# Patient Record
Sex: FEMALE | Race: WHITE | NOT HISPANIC OR LATINO | URBAN - METROPOLITAN AREA
[De-identification: names, ages, dates, MRNs, and addresses within clinical notes are randomized per-mention and may not be internally consistent; named-entity substitution may affect disease eponyms.]

---

## 2021-02-02 ENCOUNTER — AMBULATORY - HEALTHEAST (OUTPATIENT)
Dept: NURSING | Facility: CLINIC | Age: 51
End: 2021-02-02

## 2021-02-23 ENCOUNTER — AMBULATORY - HEALTHEAST (OUTPATIENT)
Dept: NURSING | Facility: CLINIC | Age: 51
End: 2021-02-23

## 2021-06-01 ENCOUNTER — RECORDS - HEALTHEAST (OUTPATIENT)
Dept: ADMINISTRATIVE | Facility: CLINIC | Age: 51
End: 2021-06-01

## 2025-01-08 ENCOUNTER — HOSPITAL ENCOUNTER (EMERGENCY)
Facility: HOSPITAL | Age: 55
Discharge: HOME | End: 2025-01-08
Attending: EMERGENCY MEDICINE
Payer: COMMERCIAL

## 2025-01-08 VITALS
WEIGHT: 293 LBS | HEART RATE: 76 BPM | HEIGHT: 66 IN | BODY MASS INDEX: 47.09 KG/M2 | DIASTOLIC BLOOD PRESSURE: 73 MMHG | SYSTOLIC BLOOD PRESSURE: 145 MMHG | TEMPERATURE: 98.1 F | RESPIRATION RATE: 19 BRPM | OXYGEN SATURATION: 98 %

## 2025-01-08 DIAGNOSIS — I48.91 ATRIAL FIBRILLATION WITH RAPID VENTRICULAR RESPONSE (H): ICD-10-CM

## 2025-01-08 PROBLEM — I48.0 PAROXYSMAL ATRIAL FIBRILLATION (H): Status: ACTIVE | Noted: 2018-01-22

## 2025-01-08 PROBLEM — Z79.01 CURRENT USE OF LONG TERM ANTICOAGULATION: Status: ACTIVE | Noted: 2021-01-08

## 2025-01-08 PROBLEM — Z98.84 H/O BARIATRIC SURGERY: Status: ACTIVE | Noted: 2021-10-18

## 2025-01-08 PROBLEM — I10 ESSENTIAL HYPERTENSION: Status: ACTIVE | Noted: 2018-04-13

## 2025-01-08 PROBLEM — G47.33 OBSTRUCTIVE SLEEP APNEA ON CPAP: Status: ACTIVE | Noted: 2018-06-19

## 2025-01-08 LAB
ALBUMIN SERPL BCG-MCNC: 4 G/DL (ref 3.5–5.2)
ALP SERPL-CCNC: 87 U/L (ref 40–150)
ALT SERPL W P-5'-P-CCNC: 17 U/L (ref 0–50)
ANION GAP SERPL CALCULATED.3IONS-SCNC: 11 MMOL/L (ref 7–15)
AST SERPL W P-5'-P-CCNC: 18 U/L (ref 0–45)
BASOPHILS # BLD AUTO: 0 10E3/UL (ref 0–0.2)
BASOPHILS NFR BLD AUTO: 0 %
BILIRUB DIRECT SERPL-MCNC: <0.2 MG/DL (ref 0–0.3)
BILIRUB SERPL-MCNC: 0.2 MG/DL
BUN SERPL-MCNC: 14.6 MG/DL (ref 6–20)
CALCIUM SERPL-MCNC: 9.5 MG/DL (ref 8.8–10.4)
CHLORIDE SERPL-SCNC: 107 MMOL/L (ref 98–107)
CREAT SERPL-MCNC: 0.66 MG/DL (ref 0.51–0.95)
EGFRCR SERPLBLD CKD-EPI 2021: >90 ML/MIN/1.73M2
EOSINOPHIL # BLD AUTO: 0.4 10E3/UL (ref 0–0.7)
EOSINOPHIL NFR BLD AUTO: 6 %
ERYTHROCYTE [DISTWIDTH] IN BLOOD BY AUTOMATED COUNT: 19.4 % (ref 10–15)
GLUCOSE SERPL-MCNC: 107 MG/DL (ref 70–99)
HCO3 SERPL-SCNC: 25 MMOL/L (ref 22–29)
HCT VFR BLD AUTO: 44.1 % (ref 35–47)
HGB BLD-MCNC: 13.7 G/DL (ref 11.7–15.7)
IMM GRANULOCYTES # BLD: 0.1 10E3/UL
IMM GRANULOCYTES NFR BLD: 1 %
LYMPHOCYTES # BLD AUTO: 1.8 10E3/UL (ref 0.8–5.3)
LYMPHOCYTES NFR BLD AUTO: 23 %
MCH RBC QN AUTO: 22.3 PG (ref 26.5–33)
MCHC RBC AUTO-ENTMCNC: 31.1 G/DL (ref 31.5–36.5)
MCV RBC AUTO: 72 FL (ref 78–100)
MONOCYTES # BLD AUTO: 0.7 10E3/UL (ref 0–1.3)
MONOCYTES NFR BLD AUTO: 9 %
NEUTROPHILS # BLD AUTO: 4.7 10E3/UL (ref 1.6–8.3)
NEUTROPHILS NFR BLD AUTO: 61 %
NRBC # BLD AUTO: 0 10E3/UL
NRBC BLD AUTO-RTO: 0 /100
NT-PROBNP SERPL-MCNC: 124 PG/ML (ref 0–900)
PLATELET # BLD AUTO: 259 10E3/UL (ref 150–450)
POTASSIUM SERPL-SCNC: 3.9 MMOL/L (ref 3.4–5.3)
PROT SERPL-MCNC: 7.1 G/DL (ref 6.4–8.3)
RBC # BLD AUTO: 6.13 10E6/UL (ref 3.8–5.2)
SODIUM SERPL-SCNC: 143 MMOL/L (ref 135–145)
T4 FREE SERPL-MCNC: 1.08 NG/DL (ref 0.9–1.7)
TROPONIN T SERPL HS-MCNC: 10 NG/L
TROPONIN T SERPL HS-MCNC: 13 NG/L
TSH SERPL DL<=0.005 MIU/L-ACNC: 4.92 UIU/ML (ref 0.3–4.2)
WBC # BLD AUTO: 7.7 10E3/UL (ref 4–11)

## 2025-01-08 PROCEDURE — 84155 ASSAY OF PROTEIN SERUM: CPT | Performed by: EMERGENCY MEDICINE

## 2025-01-08 PROCEDURE — 82040 ASSAY OF SERUM ALBUMIN: CPT | Performed by: EMERGENCY MEDICINE

## 2025-01-08 PROCEDURE — 36415 COLL VENOUS BLD VENIPUNCTURE: CPT | Performed by: EMERGENCY MEDICINE

## 2025-01-08 PROCEDURE — 84443 ASSAY THYROID STIM HORMONE: CPT | Performed by: EMERGENCY MEDICINE

## 2025-01-08 PROCEDURE — 999N000157 HC STATISTIC RCP TIME EA 10 MIN

## 2025-01-08 PROCEDURE — 250N000011 HC RX IP 250 OP 636: Performed by: EMERGENCY MEDICINE

## 2025-01-08 PROCEDURE — 250N000009 HC RX 250: Performed by: EMERGENCY MEDICINE

## 2025-01-08 PROCEDURE — 96374 THER/PROPH/DIAG INJ IV PUSH: CPT | Mod: 59 | Performed by: EMERGENCY MEDICINE

## 2025-01-08 PROCEDURE — 92960 CARDIOVERSION ELECTRIC EXT: CPT | Performed by: EMERGENCY MEDICINE

## 2025-01-08 PROCEDURE — 80048 BASIC METABOLIC PNL TOTAL CA: CPT | Performed by: EMERGENCY MEDICINE

## 2025-01-08 PROCEDURE — 84439 ASSAY OF FREE THYROXINE: CPT | Performed by: EMERGENCY MEDICINE

## 2025-01-08 PROCEDURE — 83880 ASSAY OF NATRIURETIC PEPTIDE: CPT | Performed by: EMERGENCY MEDICINE

## 2025-01-08 PROCEDURE — 84484 ASSAY OF TROPONIN QUANT: CPT | Performed by: EMERGENCY MEDICINE

## 2025-01-08 PROCEDURE — 999N000099 HC STATISTIC MODERATE SEDATION < 10 MIN

## 2025-01-08 PROCEDURE — 99285 EMERGENCY DEPT VISIT HI MDM: CPT | Mod: 25 | Performed by: EMERGENCY MEDICINE

## 2025-01-08 PROCEDURE — 85025 COMPLETE CBC W/AUTO DIFF WBC: CPT | Performed by: EMERGENCY MEDICINE

## 2025-01-08 PROCEDURE — 82310 ASSAY OF CALCIUM: CPT | Performed by: EMERGENCY MEDICINE

## 2025-01-08 RX ORDER — ALBUTEROL SULFATE 90 UG/1
1-2 INHALANT RESPIRATORY (INHALATION) EVERY 4 HOURS PRN
COMMUNITY
Start: 2024-11-14

## 2025-01-08 RX ORDER — ETOMIDATE 2 MG/ML
10 INJECTION INTRAVENOUS ONCE
Status: COMPLETED | OUTPATIENT
Start: 2025-01-08 | End: 2025-01-08

## 2025-01-08 RX ORDER — BUPROPION HYDROCHLORIDE 150 MG/1
150 TABLET, EXTENDED RELEASE ORAL 2 TIMES DAILY
COMMUNITY
Start: 2024-09-11 | End: 2025-09-11

## 2025-01-08 RX ORDER — METOPROLOL SUCCINATE 50 MG/1
50 TABLET, EXTENDED RELEASE ORAL DAILY
COMMUNITY
Start: 2024-09-11

## 2025-01-08 RX ORDER — DILTIAZEM HYDROCHLORIDE 5 MG/ML
25 INJECTION INTRAVENOUS ONCE
Status: COMPLETED | OUTPATIENT
Start: 2025-01-08 | End: 2025-01-08

## 2025-01-08 RX ADMIN — DILTIAZEM HYDROCHLORIDE 25 MG: 5 INJECTION INTRAVENOUS at 19:46

## 2025-01-08 RX ADMIN — ETOMIDATE 10 MG: 2 INJECTION, SOLUTION INTRAVENOUS at 21:01

## 2025-01-08 ASSESSMENT — ACTIVITIES OF DAILY LIVING (ADL)
ADLS_ACUITY_SCORE: 41

## 2025-01-08 ASSESSMENT — COLUMBIA-SUICIDE SEVERITY RATING SCALE - C-SSRS
1. IN THE PAST MONTH, HAVE YOU WISHED YOU WERE DEAD OR WISHED YOU COULD GO TO SLEEP AND NOT WAKE UP?: NO
6. HAVE YOU EVER DONE ANYTHING, STARTED TO DO ANYTHING, OR PREPARED TO DO ANYTHING TO END YOUR LIFE?: YES
2. HAVE YOU ACTUALLY HAD ANY THOUGHTS OF KILLING YOURSELF IN THE PAST MONTH?: NO

## 2025-01-09 NOTE — PROGRESS NOTES
RT present for cardioversion. Patient placed on 2L NC sating 97%. BVM, suction and EtCO2 ready at bedside. Cardioversion performed with no complications. RT excused from room.     Sophie Reyna, RT

## 2025-01-09 NOTE — ED PROVIDER NOTES
"EMERGENCY DEPARTMENT NOTE     Name: Isabelle Gaxiola    Age/Sex: 54 year old female   MRN: [id number]   Evaluation Date & Time:  1/8/2025  7:42 PM    PCP:    Sowmya Hanna   ED Provider: Alonso Barry D.O.       CHIEF COMPLAINT    Palpitations     HISTORY OF PRESENT ILLNESS   Isabelle Gaxiola is a 54 year old year old female with a relevant past history of paroxysmal atrial fibrillation, obesity, and hypertension, who presents to the ED  for evaluation of palpitations.      DIAGNOSIS & DISPOSITION/MEDICAL DECISION MAKING     1. Atrial fibrillation with rapid ventricular response (H)        EMERGENCY DEPARTMENT COURSE   8:19 PM I met with the patient to gather history and to perform my initial exam.  We discussed treatment options and the plan for care while in the Emergency Department.  8:23 PM I consented the patient for a cardioversion.  8:55 PM I performed the cardioversion.   Triage vital signs: BP (!) 161/87   Pulse 110   Temp 98.1  F (36.7  C) (Oral)   Resp 16   Ht 1.676 m (5' 6\")   Wt 149.7 kg (330 lb)   SpO2 100%   BMI 53.26 kg/m     Differential diagnosis considered included but not limited to: ACS, CHF, electrolyte derangement, endocrine process including hyperthyroidism  Diagnostic studies:  Laboratory studies obtained and reviewed by myself:  EKG: Atrial fibrillation with rapid ventricular response  CBC and comprehensive metabolic profile within normal limits.  T SH 4.9 but normal free T41.08 Troponin 13, N-terminal   MDM: Patient is currently anticoagulated.  She has had prior successful cardioversion.  Discussed risks and benefits of cardioversion with sedation and patient accepted.  Patient was successfully cardioverted as per procedure note.  I discussed case with cardiology and they would not make any change in her chronic medication metoprolol.  Patient will discharge.  She will follow-up with her primary cardiologist this week.  If recurrent rapid or irregular heart rate or " "develops any new symptoms including chest pain or shortness of breath will return to the emergency department.     Discharge Vital Signs:BP (!) 145/73   Pulse 76   Temp 98.1  F (36.7  C) (Oral)   Resp 19   Ht 1.676 m (5' 6\")   Wt 149.7 kg (330 lb)   SpO2 98%   BMI 53.26 kg/m     PROCEDURES:       PROCEDURE: Electrical Cardioversion with Procedural Sedation   INDICATIONS: Sedation is required to allow for Cardioversion for Atrial Fibrilation    CARDIOVERSION TYPE: Biphasic, External   SEDATION PROVIDER: Dr Alonso Barry   CARDIOVERSION PROVIDER: Dr Alonso Barry   LEVEL OF SEDATION: Deep Sedation    Defined as:  Minimal = Normal response to verbal  Moderate = Responds to verbal and light tactile stimulation  Deep = Responds after repeated painful stimulation   CONSENT: Risks, benefits and alternatives were discussed with and Written consent was obtained from Patient.   PROCEDURE SPECIFIC CHECKLIST COMPLETED: Yes   LAST ORAL INTAKE: Clear Liquids >2 hours, Full Liquids > 4 hours, and Light Meal > 6 hours   ASA CLASS: 2 - Mild systemic disease   MALLAMPATI:  II - Faucial pillars and soft palate may be seen, but uvula is masked by the base of the tongue   TIME OUT: Universal protocol was followed. TIME OUT conducted just prior to starting procedure confirmed patient identity, site/side, procedure, patient position, and availability of correct equipment. Yes    Immediately prior to initiation of sedation, reassessment of clinical condition was performed which was unchanged.   MEDICATIONS GIVEN: Etomidate, 10 mg, IV    MONITORING: heart rate, cardiac monitor, continuous pulse oximeter, continuous capnometry (end tidal CO2), frequent blood pressure checks, level of consciousness checks, IV access, constant attendance by RN until patient is recovered, and constant attendance by MD until patient is stable   RESPONSE: vital signs stable, airway patent, and O2 saturations remained >92%   POST-SEDATION " ASSESSMENT/PROCEDURE NOTE: CARDIOVERSION: Quick combo electrodes were placed anterior-posterior  Trial 1: Synchronized shock at 100 joules was Successful    SEDATION:  Lowest level oxygen saturation reached was 97%.    Post procedure patient was alert and responds to verbal stimuli    Patient was monitored during recovery and returned to pre-procedure baseline.   TOTAL MD DRUG ADMINISTRATION / MONITORING TIME: 30 minutes.   COMPLICATIONS: Patient tolerated procedure well, without complication            Diagnostic studies:  No orders to display     Labs Ordered and Resulted from Time of ED Arrival to Time of ED Departure   BASIC METABOLIC PANEL - Abnormal       Result Value    Sodium 143      Potassium 3.9      Chloride 107      Carbon Dioxide (CO2) 25      Anion Gap 11      Urea Nitrogen 14.6      Creatinine 0.66      GFR Estimate >90      Calcium 9.5      Glucose 107 (*)    TSH WITH FREE T4 REFLEX - Abnormal    TSH 4.92 (*)    CBC WITH PLATELETS AND DIFFERENTIAL - Abnormal    WBC Count 7.7      RBC Count 6.13 (*)     Hemoglobin 13.7      Hematocrit 44.1      MCV 72 (*)     MCH 22.3 (*)     MCHC 31.1 (*)     RDW 19.4 (*)     Platelet Count 259      % Neutrophils 61      % Lymphocytes 23      % Monocytes 9      % Eosinophils 6      % Basophils 0      % Immature Granulocytes 1      NRBCs per 100 WBC 0      Absolute Neutrophils 4.7      Absolute Lymphocytes 1.8      Absolute Monocytes 0.7      Absolute Eosinophils 0.4      Absolute Basophils 0.0      Absolute Immature Granulocytes 0.1      Absolute NRBCs 0.0     HEPATIC FUNCTION PANEL - Normal    Protein Total 7.1      Albumin 4.0      Bilirubin Total 0.2      Alkaline Phosphatase 87      AST 18      ALT 17      Bilirubin Direct <0.20     TROPONIN T, HIGH SENSITIVITY - Normal    Troponin T, High Sensitivity 10     NT PROBNP INPATIENT - Normal    N terminal Pro BNP Inpatient 124     T4 FREE - Normal    Free T4 1.08       ED INTERVENTIONS     Medications   diltiazem  (CARDIZEM) injection 25 mg (25 mg Intravenous $Given 1/8/25 1946)   etomidate (AMIDATE) injection 10 mg (10 mg Intravenous $Given 1/8/25 2101)     TOTAL CRITICAL CARE TIME (EXCLUDING PROCEDURES): 30 minutes for management of cardiac arrhythmia requiring IV vasoactive medications and subsequent DC cardioversion with sedation      DISCHARGE MEDICATIONS        Review of your medicines        UNREVIEWED medicines. Ask your doctor about these medicines        Dose / Directions   albuterol 108 (90 Base) MCG/ACT inhaler  Commonly known as: PROAIR HFA/PROVENTIL HFA/VENTOLIN HFA      Dose: 1-2 puff  Inhale 1-2 puffs into the lungs every 4 hours as needed for shortness of breath or wheezing.  Refills: 0     apixaban ANTICOAGULANT 5 MG tablet  Commonly known as: ELIQUIS      Dose: 5 mg  Take 5 mg by mouth 2 times daily.  Refills: 0     buPROPion 150 MG 12 hr tablet  Commonly known as: WELLBUTRIN SR      Dose: 150 mg  Take 150 mg by mouth 2 times daily.  Refills: 0     cholecalciferol 50 MCG (2000 UT) Caps      Dose: 2,000 Units  Take 2,000 Units by mouth daily.  Refills: 0     metoprolol succinate ER 50 MG 24 hr tablet  Commonly known as: TOPROL XL      Dose: 50 mg  Take 50 mg by mouth daily.  Refills: 0            DISPOSITION: Home    Medical Decision Making    At the time of my evaluation, I do not feel the patient s symptoms are caused by sepsis      I obtained additional history from these independent historians:  NA  I reviewed these outside records:  11/14/2024 Urgent Care visit for URI symptoms with cough and sore throat  10/9/2022 ED visit    I noted these abnormal vital signs / labs:  Atrial fibrillation on EKG    Monitor Strip Interpretation:  Atrial fibrillation  12-Lead ECG Interpretation:  Atrial fibrillation with rapid ventricular response  I independently reviewed the following diagnostic studies:  NA  I spoke to the following clinicians regard the patients care:  Cardiology  My disposition decision is based on  "the following reasons:    Discharge: I considered admission but discharged the patient after current workup and patient's clinical course in the emergency department.  Prescription medications prescribed:none      MIPS Documentation Not Applicable    At the conclusion of the encounter I discussed the results of all of the tests and the disposition. The questions were answered. The patient or family acknowledged understanding and was agreeable with the care plan.            INFORMATION SOURCE AND LIMITATIONS    History/Exam limitations: None  Patient information was obtained from: patient   Use of : N/A    The patient has a history of atrial fibrillation and has had 2 cardioversions in the past. Around 6:00 PM this evening (1 hour prior to arrival), the patient felt like she was \"flip-flopping\" in and out of atrial fibrillation. This sensations is \"like someone is choking me\" and it is difficult for her to take a deep breath. She takes Eliquis and Metoprolol. She previously followed with cardiology but has not had a cardioversion in a few years so she has not seen them since then. She does think there is a component of anxiety today. Her last food was at 1:00 PM but she has sipped some water throughout the day.     She denies fever or any other complaints at this time.       REVIEW OF SYSTEMS:   All other systems reviewed and are negative except as noted above in HPI.    PATIENT HISTORY   History reviewed. No pertinent past medical history.  Patient Active Problem List   Diagnosis    Current use of long term anticoagulation    Depression    Essential hypertension    H/O bariatric surgery    Obstructive sleep apnea on CPAP    Paroxysmal atrial fibrillation (H)     History reviewed. No pertinent surgical history.    Not on File    OUTPATIENT MEDICATIONS     Discharge Medication List as of 1/8/2025 10:04 PM         Vitals:    01/08/25 2101 01/08/25 2102 01/08/25 2104 01/08/25 2200   BP:    (!) 145/73   Pulse: "    76   Resp: 14 16 16 19   Temp:       TempSrc:       SpO2:    98%   Weight:       Height:           Physical Exam   Constitutional: Oriented to person, place, and time. Appears well-developed and well-nourished.   .   Cardiovascular: Regular rhythm with rapid heart rate, no murmurs  Pulmonary/Chest: Normal effort  and breath sounds normal.   Neurological: Alert and oriented to person, place, and time. Normal strength. No sensory deficit.Skin: Skin is warm and dry.   Psychiatric: Normal mood and affect. Behavior is normal. Thought content normal.     DIAGNOSTICS    LABORATORY FINDINGS (REVIEWED AND INTERPRETED):  Labs Ordered and Resulted from Time of ED Arrival to Time of ED Departure   BASIC METABOLIC PANEL - Abnormal       Result Value    Sodium 143      Potassium 3.9      Chloride 107      Carbon Dioxide (CO2) 25      Anion Gap 11      Urea Nitrogen 14.6      Creatinine 0.66      GFR Estimate >90      Calcium 9.5      Glucose 107 (*)    TSH WITH FREE T4 REFLEX - Abnormal    TSH 4.92 (*)    CBC WITH PLATELETS AND DIFFERENTIAL - Abnormal    WBC Count 7.7      RBC Count 6.13 (*)     Hemoglobin 13.7      Hematocrit 44.1      MCV 72 (*)     MCH 22.3 (*)     MCHC 31.1 (*)     RDW 19.4 (*)     Platelet Count 259      % Neutrophils 61      % Lymphocytes 23      % Monocytes 9      % Eosinophils 6      % Basophils 0      % Immature Granulocytes 1      NRBCs per 100 WBC 0      Absolute Neutrophils 4.7      Absolute Lymphocytes 1.8      Absolute Monocytes 0.7      Absolute Eosinophils 0.4      Absolute Basophils 0.0      Absolute Immature Granulocytes 0.1      Absolute NRBCs 0.0     HEPATIC FUNCTION PANEL - Normal    Protein Total 7.1      Albumin 4.0      Bilirubin Total 0.2      Alkaline Phosphatase 87      AST 18      ALT 17      Bilirubin Direct <0.20     TROPONIN T, HIGH SENSITIVITY - Normal    Troponin T, High Sensitivity 10     NT PROBNP INPATIENT - Normal    N terminal Pro BNP Inpatient 124     T4 FREE - Normal     Free T4 1.08           IMAGING (REVIEWED AND INTERPRETED):  No orders to display         ECG (REVIEWED AND INTERPRETED):   ECG:   Performed at: 19:11  HR:  131 bpm  Rhythm: Atrial fibrillation with RVR with premature ventricular or aberrantly conducted complexes  Axis: 7  QRS duration: 86 ms  QTC: 306/451 ms  ST changes: No ST segment elevation or depression, no T wave inversion,No Q wave  Interpretation: Atrial fibrillation with rapid ventricular response  Compared to: No prior for comparison      Performed at: 21:06  HR:  78 bpm  Rhythm: Sinus rhythm  Axis: 3  QRS duration: 86 ms  QTC: 370/421  ST changes: No ST segment elevation or depression, no T wave inversion,No Q wave  Interpretation: Normal sinus rhythm  Compared to most recent ECG from: 17:11 sinus rhythm replaces atrial fibrillation    I have reviewed the patient's ECG, with comments made as listed above. Please see scanned image for full interpretation.       I, Derrick Mohamud, am serving as a scribe to document services personally performed by Dr. Alonso Barry, based on my observations and the provider's statements to me. I, Dr. Alonso Barry, attest that Derrick Mohamud is acting in a scribe capacity, has observed my performance of the services and has documented them in accordance with my direction.       Alonso Barry D.O.  EMERGENCY MEDICINE   01/08/25  [facility] EMERGENCY DEPARTMENT  [address] [place] [id number]  [phone]  Dept: [phone]     Alonso Barry DO  01/08/25 2318

## 2025-01-09 NOTE — ED TRIAGE NOTES
"Pt reports awakened from nap at 1800 noted palpations, dizziness and \"pressure pain around neck.      Triage Assessment (Adult)       Row Name 01/08/25 1922          Triage Assessment    Airway WDL WDL        Respiratory WDL    Respiratory WDL WDL        Skin Circulation/Temperature WDL    Skin Circulation/Temperature WDL WDL        Cardiac WDL    Cardiac WDL X     Cardiac Rhythm Atrial fibrillation        Peripheral/Neurovascular WDL    Peripheral Neurovascular WDL WDL        Cognitive/Neuro/Behavioral WDL    Cognitive/Neuro/Behavioral WDL WDL                     "

## 2025-01-09 NOTE — ED NOTES
[facility]  Expected date:   Expected time:   Means of arrival:   Comments:  ED 7 cardioversion

## 2025-01-09 NOTE — DISCHARGE INSTRUCTIONS
Continue medications as prescribed.  Contact your cardiologist.  If you have recurrent palpitations or develop any new symptoms including chest pain or shortness of breath return to the emergency department.

## 2025-01-12 ENCOUNTER — APPOINTMENT (OUTPATIENT)
Dept: RADIOLOGY | Facility: HOSPITAL | Age: 55
End: 2025-01-12
Payer: COMMERCIAL

## 2025-01-12 ENCOUNTER — HOSPITAL ENCOUNTER (EMERGENCY)
Facility: HOSPITAL | Age: 55
Discharge: HOME | End: 2025-01-12
Attending: EMERGENCY MEDICINE | Admitting: EMERGENCY MEDICINE
Payer: COMMERCIAL

## 2025-01-12 VITALS
OXYGEN SATURATION: 98 % | BODY MASS INDEX: 47.09 KG/M2 | DIASTOLIC BLOOD PRESSURE: 88 MMHG | HEART RATE: 66 BPM | WEIGHT: 293 LBS | RESPIRATION RATE: 16 BRPM | HEIGHT: 66 IN | TEMPERATURE: 99 F | SYSTOLIC BLOOD PRESSURE: 164 MMHG

## 2025-01-12 DIAGNOSIS — I48.0 PAROXYSMAL A-FIB (H): ICD-10-CM

## 2025-01-12 DIAGNOSIS — R00.2 PALPITATIONS: ICD-10-CM

## 2025-01-12 DIAGNOSIS — R07.89 LEFT-SIDED CHEST WALL PAIN: ICD-10-CM

## 2025-01-12 LAB
ANION GAP SERPL CALCULATED.3IONS-SCNC: 11 MMOL/L (ref 7–15)
BASOPHILS # BLD AUTO: 0 10E3/UL (ref 0–0.2)
BASOPHILS NFR BLD AUTO: 1 %
BUN SERPL-MCNC: 11.7 MG/DL (ref 6–20)
CALCIUM SERPL-MCNC: 9.6 MG/DL (ref 8.8–10.4)
CHLORIDE SERPL-SCNC: 106 MMOL/L (ref 98–107)
CREAT SERPL-MCNC: 0.66 MG/DL (ref 0.51–0.95)
EGFRCR SERPLBLD CKD-EPI 2021: >90 ML/MIN/1.73M2
EOSINOPHIL # BLD AUTO: 0.4 10E3/UL (ref 0–0.7)
EOSINOPHIL NFR BLD AUTO: 4 %
ERYTHROCYTE [DISTWIDTH] IN BLOOD BY AUTOMATED COUNT: 18.9 % (ref 10–15)
GLUCOSE SERPL-MCNC: 97 MG/DL (ref 70–99)
HCO3 SERPL-SCNC: 25 MMOL/L (ref 22–29)
HCT VFR BLD AUTO: 41.9 % (ref 35–47)
HGB BLD-MCNC: 13 G/DL (ref 11.7–15.7)
HOLD SPECIMEN: NORMAL
IMM GRANULOCYTES # BLD: 0.1 10E3/UL
IMM GRANULOCYTES NFR BLD: 1 %
LYMPHOCYTES # BLD AUTO: 1.3 10E3/UL (ref 0.8–5.3)
LYMPHOCYTES NFR BLD AUTO: 15 %
MAGNESIUM SERPL-MCNC: 2 MG/DL (ref 1.7–2.3)
MCH RBC QN AUTO: 22.3 PG (ref 26.5–33)
MCHC RBC AUTO-ENTMCNC: 31 G/DL (ref 31.5–36.5)
MCV RBC AUTO: 72 FL (ref 78–100)
MONOCYTES # BLD AUTO: 0.7 10E3/UL (ref 0–1.3)
MONOCYTES NFR BLD AUTO: 8 %
NEUTROPHILS # BLD AUTO: 6.3 10E3/UL (ref 1.6–8.3)
NEUTROPHILS NFR BLD AUTO: 72 %
NRBC # BLD AUTO: 0 10E3/UL
NRBC BLD AUTO-RTO: 0 /100
PLATELET # BLD AUTO: 261 10E3/UL (ref 150–450)
POTASSIUM SERPL-SCNC: 4.1 MMOL/L (ref 3.4–5.3)
RBC # BLD AUTO: 5.83 10E6/UL (ref 3.8–5.2)
SODIUM SERPL-SCNC: 142 MMOL/L (ref 135–145)
TROPONIN T SERPL HS-MCNC: 10 NG/L
TROPONIN T SERPL HS-MCNC: 10 NG/L
WBC # BLD AUTO: 8.8 10E3/UL (ref 4–11)

## 2025-01-12 PROCEDURE — 36415 COLL VENOUS BLD VENIPUNCTURE: CPT | Performed by: EMERGENCY MEDICINE

## 2025-01-12 PROCEDURE — 84484 ASSAY OF TROPONIN QUANT: CPT | Performed by: EMERGENCY MEDICINE

## 2025-01-12 PROCEDURE — 85025 COMPLETE CBC W/AUTO DIFF WBC: CPT | Performed by: EMERGENCY MEDICINE

## 2025-01-12 PROCEDURE — 71046 X-RAY EXAM CHEST 2 VIEWS: CPT

## 2025-01-12 PROCEDURE — 83735 ASSAY OF MAGNESIUM: CPT

## 2025-01-12 PROCEDURE — 80048 BASIC METABOLIC PNL TOTAL CA: CPT | Performed by: EMERGENCY MEDICINE

## 2025-01-12 PROCEDURE — 82310 ASSAY OF CALCIUM: CPT | Performed by: EMERGENCY MEDICINE

## 2025-01-12 PROCEDURE — 93005 ELECTROCARDIOGRAM TRACING: CPT | Performed by: STUDENT IN AN ORGANIZED HEALTH CARE EDUCATION/TRAINING PROGRAM

## 2025-01-12 PROCEDURE — 99285 EMERGENCY DEPT VISIT HI MDM: CPT | Mod: 25

## 2025-01-12 PROCEDURE — 93005 ELECTROCARDIOGRAM TRACING: CPT | Performed by: EMERGENCY MEDICINE

## 2025-01-12 ASSESSMENT — ACTIVITIES OF DAILY LIVING (ADL)
ADLS_ACUITY_SCORE: 41

## 2025-01-12 ASSESSMENT — COLUMBIA-SUICIDE SEVERITY RATING SCALE - C-SSRS
1. IN THE PAST MONTH, HAVE YOU WISHED YOU WERE DEAD OR WISHED YOU COULD GO TO SLEEP AND NOT WAKE UP?: NO
2. HAVE YOU ACTUALLY HAD ANY THOUGHTS OF KILLING YOURSELF IN THE PAST MONTH?: NO
6. HAVE YOU EVER DONE ANYTHING, STARTED TO DO ANYTHING, OR PREPARED TO DO ANYTHING TO END YOUR LIFE?: NO

## 2025-01-12 NOTE — ED NOTES
"Patient back from xray--watching TV--states her symptoms are \"better\"--mild but still occasionally present.  "

## 2025-01-12 NOTE — ED NOTES
"Patient placed her light on--was told to call if she is feeling symptoms--patient says she is feeling lightheaded and that her heart is beating \"too fast and too hard\".  No change in rhythm--continues SB 60s and no change in BP--164/79.  Updated provider.   "

## 2025-01-12 NOTE — ED TRIAGE NOTES
"Patient with A Fib history was in ER Jan 8, 2025 with RVR, cardioverted, discharged to home. To ER today via [facility], feels \"it sometimes it feels like it is racing, or beating out of my chest and sometimes like someone pushed me in the chest, it is not a pain it is a ache\" EKG by [facility] NSR. Blood pressure for medics elevated at 180/ they did given NTG x1 and ASA 324mg. IV placed by [facility]        "

## 2025-01-12 NOTE — DISCHARGE INSTRUCTIONS
You were seen in the ER for evaluation of chest pain. Your work-up was overall reassuring as discussed.    Rest, no heavy lifting, ice/heat, tylenol and/or ibuprofen as needed for pain.     Tylenol (Acetaminophen) Discharge Instructions:  You may take 2 tablets of regular strength, over-the-counter, Tylenol (acetaminophen) every 4-6 hours as needed for pain.  Take no more than 4000 mg of Tylenol in a 24-hour period.      Avoid taking more than 1 acetaminophen-containing product at a time and be aware that many over-the-counter medications contain a combination of acetaminophen and other products.  If you are taking Tylenol in addition to a combination product please keep track of your daily acetaminophen dose to make sure you do not exceed the recommended 4000 mg.  Taking too much acetaminophen can cause permanent damage to your liver.    Ibuprofen/Naproxen Discharge Instructions:  You may take ibuprofen for pain control.  The maximum dose of (ibuprofen is 3200 mg ) in a 24-hour period.    Take this medication with food to prevent stomach irritation.  With long-term use this medication can irritate the stomach causing pain and lead to development of a stomach ulcer.  If you notice stomach pain or vomiting of coffee-ground colored vomit or blood, please be seen by a healthcare provider.  Attempt to use this medication for the shortest time possible.      Please follow-up with your primary care provider for reevaluation and ongoing management, especially if symptoms persist. Please follow-up with cardiology in next 24-48 hours, a referral was placed today.    Return to the emergency department for any new or worsening symptoms including recurrent chest pain, shortness of breath, exertional symptoms, palpitations, lightheadedness/fainting, fever/chills, or any other concerning symptoms.    Take Care!  - Yuliya Pak PA-C

## 2025-01-12 NOTE — ED PROVIDER NOTES
"Emergency Department Midlevel Supervisory Note     I had a face to face encounter with this patient seen by the Advanced Practice Provider (JESSIE). I personally made/approved the management plan and take responsibility for the patient management. I personally saw patient and performed a substantive portion of the visit including all aspects of the medical decision making.     ED Course:  3:13 PM Yuliya Pak PA-C staffed patient with me. I agree with their assessment and plan of management, and I will see the patient.  3:20 PM I met with the patient to introduce myself, gather additional history, perform my initial exam, and discuss the plan.     Brief HPI:     Isabelle Gaxiola is a 54 year old female who presents for evaluation of chest pain.    The patient reports she experienced a feeling of her heart beating fast and felt like it was beating out of her chest around 11:00 today. She endorses having a similar incident last week, but the one today was less severe. She notes measuring her blood pressure at home which read 185/95. The patient endorses having a history of atrial fibrillation for which she takes eliquis and metoprolol. She states she has not had an atrial fibrillation episode for several years until last week. She notes feeling more tired and weak prior to her episode last week. The patient notes being sick 2 months ago and not feeling 100% since then, but denies having any current illnesses.    I, Neftali Barker, am serving as a scribe to document services personally performed by Sanaz Jerome MD, based on my observations and the provider's statements to me.   I, Sanaz Jerome MD, attest that Neftali Barker was acting in a scribe capacity, has observed my performance of the services and has documented them in accordance with my direction.    Brief Physical Exam: BP (!) 164/88   Pulse 66   Temp 99  F (37.2  C)   Resp 16   Ht 1.676 m (5' 6\")   Wt 149.7 kg (330 lb)   SpO2 98%   BMI 53.26 kg/m  "   Constitutional:  Alert, in no acute distress. Sitting up in bed and conversational.  EYES: Conjunctivae clear  HENT:  Atraumatic. Mucous membranes moist and dark pink.  Respiratory:  Respirations even, unlabored, in no acute respiratory distress. Clear to ausculation.  Cardiovascular:  Regular rate and rhythm, good peripheral perfusion. Strong peripheral pulses  GI: Soft, non-distended, non-tender  Musculoskeletal:  Moves all 4 extremities equally, grossly symmetrical strength  Integument: Warm & dry. No appreciable rash, erythema.  Neurologic:  Alert & oriented, speech clear and fluent, no focal deficits noted  Psych: Normal mood and affect       MDM:  After seeing the patient she describes the chest discomfort occurring really only with the episodes of palpitations.  She was not familiar with how to check her pulse while these are occurring at home but does have a blood pressure cuff that it will typically tell her if her heart rate is irregular and although as she does not recall what her heart rate was when she checked during an episode this morning it did not tell her that her heart rate was irregular.  So, she will let the nurse know if she has any further episodes while here so we can double check and make sure there is no arrhythmia.  In the meantime because of the chest discomfort although rather atypical we will do sequential troponins on her today.  With no symptoms in between I think very rare that this would end up being cardiac ischemia related to the recent atrial fibs.  More likely it is chest wall muscular irritation from the cardioversion.  Otherwise no associated GI symptoms to suggest upper GI cause.  I educated patient on how to check the quality and rate of her pulse when these are occurring at home.  I checked her blood work from January 9 with our physician assistant and no need to repeat the basic metabolic panel TSH or hemogram as she has no new medications or new symptoms that would have  caused changes in those.  Chest x-ray will be done for possible inflammatory changes.    Repeat troponin is not significantly changed.  She had the palpitations again here notified the nurse and no arrhythmia was noted so this is again likely chest wall muscle irritation from recent cardioversion.  Patient discharged home with referrals for rapid access clinic.  Keep medication doses the same as her hr at baseline 60s       1. Palpitations    2. Left-sided chest wall pain    3. Paroxysmal A-fib (H)        Labs and Imaging:  Results for orders placed or performed during the hospital encounter of 01/12/25   Chest XR,  PA & LAT    Impression    IMPRESSION: No evidence of acute cardiopulmonary disease. Exaggerated thoracic kyphosis without definite compression fracture visualized.   Basic metabolic panel   Result Value Ref Range    Sodium 142 135 - 145 mmol/L    Potassium 4.1 3.4 - 5.3 mmol/L    Chloride 106 98 - 107 mmol/L    Carbon Dioxide (CO2) 25 22 - 29 mmol/L    Anion Gap 11 7 - 15 mmol/L    Urea Nitrogen 11.7 6.0 - 20.0 mg/dL    Creatinine 0.66 0.51 - 0.95 mg/dL    GFR Estimate >90 >60 mL/min/1.73m2    Calcium 9.6 8.8 - 10.4 mg/dL    Glucose 97 70 - 99 mg/dL   Result Value Ref Range    Troponin T, High Sensitivity 10 <=14 ng/L   Result Value Ref Range    Magnesium 2.0 1.7 - 2.3 mg/dL   CBC with platelets and differential   Result Value Ref Range    WBC Count 8.8 4.0 - 11.0 10e3/uL    RBC Count 5.83 (H) 3.80 - 5.20 10e6/uL    Hemoglobin 13.0 11.7 - 15.7 g/dL    Hematocrit 41.9 35.0 - 47.0 %    MCV 72 (L) 78 - 100 fL    MCH 22.3 (L) 26.5 - 33.0 pg    MCHC 31.0 (L) 31.5 - 36.5 g/dL    RDW 18.9 (H) 10.0 - 15.0 %    Platelet Count 261 150 - 450 10e3/uL    % Neutrophils 72 %    % Lymphocytes 15 %    % Monocytes 8 %    % Eosinophils 4 %    % Basophils 1 %    % Immature Granulocytes 1 %    NRBCs per 100 WBC 0 <1 /100    Absolute Neutrophils 6.3 1.6 - 8.3 10e3/uL    Absolute Lymphocytes 1.3 0.8 - 5.3 10e3/uL    Absolute  Monocytes 0.7 0.0 - 1.3 10e3/uL    Absolute Eosinophils 0.4 0.0 - 0.7 10e3/uL    Absolute Basophils 0.0 0.0 - 0.2 10e3/uL    Absolute Immature Granulocytes 0.1 <=0.4 10e3/uL    Absolute NRBCs 0.0 10e3/uL   Extra Red Top Tube   Result Value Ref Range    Hold Specimen JIC    Result Value Ref Range    Troponin T, High Sensitivity 10 <=14 ng/L       I have reviewed the relevant laboratory studies above.    I independently interpreted the following imaging study(s):   Cxr, results agree with radiologist    EKG: I reviewed and independently interpreted the patient's EKG, with comments made as listed below. Please see scanned EKG for full report.   Performed at:  1432 prior to my arrrival for shift.  I reviewed when seeing the patient.  Impression:  nsr rate of 63, no acute st changes, pr 42ms, qrs 86ms, qtc 405ms, prt axes 12 6 29.  Compared to 1/8/25 at 2106 no significant changes noted.    Procedures:  I was present for the key portions of procedures documented in JESSIE/midlevel note, see midlevel note for further details.    Sanaz Jerome MD  [facility] EMERGENCY DEPARTMENT  [address] [address] [phone]

## 2025-01-12 NOTE — ED PROVIDER NOTES
EMERGENCY DEPARTMENT ENCOUNTER      NAME: Isabelle Gaxiola  AGE: 54 year old female  YOB: 1970  MRN: [id number]  EVALUATION DATE & TIME: 2025  2:39 PM    PCP: Sowmya Hanna    ED PROVIDER: Yuliya Pak PA-C      Chief Complaint   Patient presents with    Chest Pain         FINAL IMPRESSION:  1. Palpitations    2. Left-sided chest wall pain    3. Paroxysmal A-fib (H)          ED COURSE & MEDICAL DECISION MAKIN:42 PM Met with patient for initial interview. Plan for care discussed.  3:05 PM Staffed patient with Dr. Jerome.  4:30 PM Reevaluated and updated patient.   4:45 PM Patient ultimately signed out to Dr. Jerome pending repeat troponin and CXR. Please see her note for final disposition.    54 year old female with a history of paroxysmal atrial fibrillation, HTN, s/p bariatric surgery presents to the Emergency Department for evaluation of intermittent palpitations that started around 11 AM today.  She states symptoms include palpitations for 1 to 2 minutes, then will relieve and feel normal for a few minutes, then return back to palpitations for a few minutes.  She reports this has been ongoing since 11 AM this morning.  She states it feels somewhat similar to when she has had atrial fibrillation in the past, but notes it feels less severe currently.  Per chart review, patient was evaluated in the ED on 2025 in the setting of paroxysmal atrial fibrillation with RVR.  She was initially treated with Cardizem and ultimately cardioverted with success.  Workup including CBC, electrolytes, thyroid function, BNP found to be within normal limits. She received sublingual nitroglycerine and 325 mg chewable aspirin without change in symptoms. Upon exam, patient is afebrile, hypertensive, but in no acute distress. RRR and lungs clear to auscultation bilaterally. Differential diagnosis includes but not limited to cardiac arrhythmia, ACS, pneumonia, pleural effusion, electrolyte  abnormality, anemia, dehydration. Low suspicion for PE as no associated SOB, hypoxia, and patient states good compliance with Eliquis. Based on patient's presenting symptoms, laboratories and imaging were ordered. Patient declined additional analgesia upon arrival.     CBC without leukocytosis or anemia. BMP WNL. Mg WNL. EKG without ST elevation. Initial troponin 10, repeat pending at time of sign out as well as CXR. Patient was observed in the ED and had recurrent episodes of palpitations, but without EKG changes. Patient was made aware of the above findings thus far. Please see Dr. Jerome's note for final disposition.     Medical Decision Making  Obtained supplemental history:Supplemental history obtained?: No  Reviewed external records: External records reviewed?: Documented in chart  Care impacted by chronic illness:Documented in Chart  Did you consider but not order tests?: Work up considered but not performed and documented in chart, if applicable  Did you interpret images independently?: Independent interpretation of ECG and images noted in documentation, when applicable.  Consultation discussion with other provider:Did you involve another provider (consultant, , pharmacy, etc.)?: No  Admission considered. Patient was signed out to the oncoming physician, disposition pending.    MIPS: Not Applicable      MEDICATIONS GIVEN IN THE EMERGENCY:  Medications - No data to display    NEW PRESCRIPTIONS STARTED AT TODAY'S ER VISIT  New Prescriptions    No medications on file          =================================================================    HPI    Patient information was obtained from: patient    Use of : N/A       Isabelle Gaxiola is a 54 year old female with a pertinent history of paroxysmal atrial fibrillation, HTN, s/p bariatric surgery presents to the Emergency Department for evaluation of intermittent palpitations that started around 11 AM today.  She states symptoms include  palpitations for 1 to 2 minutes, then will relieve and feel normal for a few minutes, then return back to palpitations for a few minutes.  She reports this has been ongoing since 11 AM this morning.  She states it feels somewhat similar to when she has had atrial fibrillation in the past, but notes it feels less severe currently.  Per chart review, patient was evaluated in the ED on 1/8/2025 in the setting of paroxysmal atrial fibrillation with RVR.  She was initially treated with Cardizem and ultimately cardioverted with success.  Workup including CBC, electrolytes, thyroid function, BNP found to be within normal limits. She received sublingual nitroglycerine and 325 mg chewable aspirin without change in symptoms.  She reports palpitations of colicky nature.  She states that she feels like her heart is beating out of her chest during these episodes and beating quite hard.  She denies any associated shortness of breath.  She reports having some chest discomfort while she is having the palpitations and she states she feels as though she can feel her heartbeat in the anterior chest and in her back.  However, she states that this sensation does go away in the periods of resolution of her palpitations.  She denies any  associated symptoms with exertion.  Symptoms do not seem to be positional.  Cough.  She denies any lower extremity swelling, calf pain, recent weight gain.      REVIEW OF SYSTEMS   Review of Systems See HPI    PAST MEDICAL HISTORY:  No past medical history on file.    PAST SURGICAL HISTORY:  No past surgical history on file.        CURRENT MEDICATIONS:    albuterol (PROAIR HFA/PROVENTIL HFA/VENTOLIN HFA) 108 (90 Base) MCG/ACT inhaler  apixaban ANTICOAGULANT (ELIQUIS) 5 MG tablet  buPROPion (WELLBUTRIN SR) 150 MG 12 hr tablet  cholecalciferol 50 MCG (2000 UT) CAPS  metoprolol succinate ER (TOPROL XL) 50 MG 24 hr tablet        ALLERGIES:  Allergies   Allergen Reactions    Prednisone Other (See Comments)      "\"Makes me angry\"    Keflex [Cephalexin] Rash       FAMILY HISTORY:  No family history on file.    SOCIAL HISTORY:   Social History     Socioeconomic History    Marital status: Single       VITALS:  BP (!) 170/76   Pulse 56   Temp 99  F (37.2  C)   Resp 18   Ht 1.676 m (5' 6\")   Wt 149.7 kg (330 lb)   SpO2 97%   BMI 53.26 kg/m      PHYSICAL EXAM    Constitutional:  Alert, in no acute distress. Cooperative.  EYES: Conjunctivae clear.   HENT:  Atraumatic, normocephalic.  Respiratory:  Respirations even, unlabored, in no acute respiratory distress. Lungs clear to auscultation bilaterally without wheeze, rhonchi, or rales. No cough. Speaks in full sentences easily.  Cardiovascular:  Regular rate and rhythm, good peripheral perfusion. No peripheral edema or calf tenderness to palpation. Mild reproducible left anterior chest wall tenderness to palpation with overlying skin irritation from recent cardioversion pads.  GI: Soft, flat, non-distended. Bowel sounds normal. No tenderness to palpation. No guarding, rebound, or other peritoneal signs.  Musculoskeletal:  No edema. No cyanosis. Range of motion major extremities intact.    Integument: Warm, Dry. No rash to visualized skin.   Neurologic:  Alert & oriented. No focal deficits noted.   Psych: Normal mood and affect.      LAB:  All pertinent labs reviewed and interpreted.  Results for orders placed or performed during the hospital encounter of 01/12/25   Basic metabolic panel   Result Value Ref Range    Sodium 142 135 - 145 mmol/L    Potassium 4.1 3.4 - 5.3 mmol/L    Chloride 106 98 - 107 mmol/L    Carbon Dioxide (CO2) 25 22 - 29 mmol/L    Anion Gap 11 7 - 15 mmol/L    Urea Nitrogen 11.7 6.0 - 20.0 mg/dL    Creatinine 0.66 0.51 - 0.95 mg/dL    GFR Estimate >90 >60 mL/min/1.73m2    Calcium 9.6 8.8 - 10.4 mg/dL    Glucose 97 70 - 99 mg/dL   Result Value Ref Range    Troponin T, High Sensitivity 10 <=14 ng/L   Result Value Ref Range    Magnesium 2.0 1.7 - 2.3 mg/dL "   CBC with platelets and differential   Result Value Ref Range    WBC Count 8.8 4.0 - 11.0 10e3/uL    RBC Count 5.83 (H) 3.80 - 5.20 10e6/uL    Hemoglobin 13.0 11.7 - 15.7 g/dL    Hematocrit 41.9 35.0 - 47.0 %    MCV 72 (L) 78 - 100 fL    MCH 22.3 (L) 26.5 - 33.0 pg    MCHC 31.0 (L) 31.5 - 36.5 g/dL    RDW 18.9 (H) 10.0 - 15.0 %    Platelet Count 261 150 - 450 10e3/uL    % Neutrophils 72 %    % Lymphocytes 15 %    % Monocytes 8 %    % Eosinophils 4 %    % Basophils 1 %    % Immature Granulocytes 1 %    NRBCs per 100 WBC 0 <1 /100    Absolute Neutrophils 6.3 1.6 - 8.3 10e3/uL    Absolute Lymphocytes 1.3 0.8 - 5.3 10e3/uL    Absolute Monocytes 0.7 0.0 - 1.3 10e3/uL    Absolute Eosinophils 0.4 0.0 - 0.7 10e3/uL    Absolute Basophils 0.0 0.0 - 0.2 10e3/uL    Absolute Immature Granulocytes 0.1 <=0.4 10e3/uL    Absolute NRBCs 0.0 10e3/uL   Extra Red Top Tube   Result Value Ref Range    Hold Specimen JIC        RADIOLOGY:  Reviewed all pertinent imaging. Please see official radiology report.  Chest XR,  PA & LAT    (Results Pending)       EKG:    Performed at: 1/12/25  Impression: NSR  Rate: 63  Rhythm: sinus  Axis: 12   6   29  SC Interval: 142  QRS Interval: 86  QTc Interval: 405  ST Changes: none  Comparison: 1/08/25 no significant change    Dr. Jerome and I have independently reviewed and interpreted the EKG(s) documented above.    Yuliya Pak PA-C  [facility] EMERGENCY DEPARTMENT  [facility] [facility] [address] [phone]      Yuliya Pak PA-C  01/12/25 [id number]

## 2025-01-23 LAB
ATRIAL RATE - MUSE: 63 BPM
DIASTOLIC BLOOD PRESSURE - MUSE: NORMAL MMHG
INTERPRETATION ECG - MUSE: NORMAL
P AXIS - MUSE: 12 DEGREES
PR INTERVAL - MUSE: 142 MS
QRS DURATION - MUSE: 86 MS
QT - MUSE: 396 MS
QTC - MUSE: 405 MS
R AXIS - MUSE: 6 DEGREES
SYSTOLIC BLOOD PRESSURE - MUSE: NORMAL MMHG
T AXIS - MUSE: 29 DEGREES
VENTRICULAR RATE- MUSE: 63 BPM

## 2025-01-28 ENCOUNTER — DOCUMENTATION ONLY (OUTPATIENT)
Dept: CARDIOLOGY | Facility: CLINIC | Age: 55
End: 2025-01-28

## 2025-01-28 ENCOUNTER — OFFICE VISIT (OUTPATIENT)
Dept: CARDIOLOGY | Facility: CLINIC | Age: 55
End: 2025-01-28
Payer: COMMERCIAL

## 2025-01-28 ENCOUNTER — PREP FOR PROCEDURE (OUTPATIENT)
Dept: CARDIOLOGY | Facility: CLINIC | Age: 55
End: 2025-01-28

## 2025-01-28 VITALS
DIASTOLIC BLOOD PRESSURE: 84 MMHG | WEIGHT: 293 LBS | SYSTOLIC BLOOD PRESSURE: 152 MMHG | HEART RATE: 80 BPM | HEIGHT: 66 IN | BODY MASS INDEX: 47.09 KG/M2 | RESPIRATION RATE: 16 BRPM

## 2025-01-28 DIAGNOSIS — I48.0 PAROXYSMAL A-FIB (H): Primary | ICD-10-CM

## 2025-01-28 DIAGNOSIS — I48.0 PAROXYSMAL A-FIB (H): ICD-10-CM

## 2025-01-28 DIAGNOSIS — R00.2 PALPITATIONS: ICD-10-CM

## 2025-01-28 DIAGNOSIS — I48.91 ATRIAL FIBRILLATION WITH RAPID VENTRICULAR RESPONSE (H): ICD-10-CM

## 2025-01-28 DIAGNOSIS — R06.83 SNORING: Primary | ICD-10-CM

## 2025-01-28 DIAGNOSIS — R07.89 LEFT-SIDED CHEST WALL PAIN: ICD-10-CM

## 2025-01-28 DIAGNOSIS — I48.91 ATRIAL FIBRILLATION WITH RAPID VENTRICULAR RESPONSE (H): Primary | ICD-10-CM

## 2025-01-28 PROCEDURE — G2211 COMPLEX E/M VISIT ADD ON: HCPCS | Performed by: INTERNAL MEDICINE

## 2025-01-28 PROCEDURE — 99205 OFFICE O/P NEW HI 60 MIN: CPT | Performed by: INTERNAL MEDICINE

## 2025-01-28 RX ORDER — LIDOCAINE 40 MG/G
CREAM TOPICAL
OUTPATIENT
Start: 2025-01-28

## 2025-01-28 RX ORDER — SODIUM CHLORIDE 9 MG/ML
100 INJECTION, SOLUTION INTRAVENOUS CONTINUOUS
OUTPATIENT
Start: 2025-01-28

## 2025-01-28 RX ORDER — FENTANYL CITRATE 50 UG/ML
25 INJECTION, SOLUTION INTRAMUSCULAR; INTRAVENOUS
OUTPATIENT
Start: 2025-01-28

## 2025-01-28 RX ORDER — FERROUS GLUCONATE 324(38)MG
324 TABLET ORAL
COMMUNITY
Start: 2024-09-30

## 2025-01-28 NOTE — PROGRESS NOTES
PVI  Order Case Req Y  Order Set Y Imaging Order Echo     AC Eliquis-CONTINUE   AAD Metoprolol-Hold 3 days prior   PPI/H2 Blocker Start Protonix 40mg Daily 3 days prior, 6wk post   Diuretics None   DM/GLP-1 DM Meds- None  GLP-1- None    Please call isabelle to schedule her for an AF ablation     Could be considered for a PFA ablation     General Anesthesia   CARTO mapping system   Hold Metoprolol 3 days prior to ablation   Continue anticoagulation   CBC, BMP  on day of procedure     Thanks   CAROLE   Isabelle Gaxiola, 1970, [id number]  Home:[phone] (home) Cell:[phone] (mobile)  Emergency Contact: JOSELYN CHONG   PCP: Sowmya Hanna, [phone]    Important patient information for CSC/Cath Lab staff : None    [facility] EP Cath Lab Procedure Order   Ablation Type:  Atrial Fibrillation (Paroxsymal)  Case Request: PFA  Ordering Provider: Dr Shah  Date Ordered and Prepped: 1/28/2025 Naomi Martinez RN    Scheduling Information:  Anticipated Case Duration:  Standard ( Case per day SA 2:1, DW 5:1, CAROLE 3:1)   Scheduling Timeframe:  Next Available  Scheduling Restrictions: None  Scheduling Contact: Please contact pt to schedule, if you are unable to schedule date within the next 24 hours please contact pt to update on scheduling process  EP RN Follow Up Apt: Schedule EP RN PC visit 3-4 days s/p PVI  MD Preference: Scheduling with ordering provider  Current Device/Device Co Needed for Procedure: None NoneNone  Pre-Procedural Testing needed: Echo  Mapping System Required:  Carto (Dr Armando and Dr Shah)  ICE Needed:  Yes  Anesthesia:General Anesthesia    [facility] EP Cath Lab Prep   H&P:  Compled by cardiology on 1-28-25 if scheduled within 30 days, pt will need RN education and Labs apt within 3 days of procedure. If pts PVI scheduled outside of 30 days, pt to schedule H&P with EP JESSIE, RN Teach, and Labs within 30 days of PVI  Pre-op Labs: CBC, BMP, Beta HcG if appropriate, and INR if on Warfarin will be ordered  "AM of procedure, if not completed at pre-op H&P within 7 days of procedure.  T&S Pre-Procedure Review: Does not need for PVI procedures  Medical Records Pertinent for Procedure:  None  Iodinated Contrast Dye Allergies (Does not include Shellfish, Egg, and/or Iodine Allergy): NA  GLP-1 Protocol: If on Dulaglutide (Trulicity) (weekly)- Injection hold 7 days prior to procedure  , Exenatide extended release (Bydureon bcise) (weekly)- Injection hold 7 days prior to procedure, Exenatide (Byetta) (twice daily)- Oral Tablet hold day prior and morning of procedure and for Injection hold 7 days prior to procedure, Semaglutide (Ozempic) (weekly)- Injection and Oral hold 7 days prior to procedure, Liraglutide (Victoza, Saxenda) (daily)- Injection hold day prior and morning of procedure  Follow Up S/P: EP RN 3-4 PC Visit and EP JESSIE 6wk (To be scheduled at time of case scheduling)    Allergies   Allergen Reactions    Prednisone Other (See Comments)     \"Makes me angry\"    Keflex [Cephalexin] Rash       Current Outpatient Medications:     albuterol (PROAIR HFA/PROVENTIL HFA/VENTOLIN HFA) 108 (90 Base) MCG/ACT inhaler, Inhale 1-2 puffs into the lungs every 4 hours as needed for shortness of breath or wheezing., Disp: , Rfl:     apixaban ANTICOAGULANT (ELIQUIS) 5 MG tablet, Take 5 mg by mouth 2 times daily., Disp: , Rfl:     buPROPion (WELLBUTRIN SR) 150 MG 12 hr tablet, Take 150 mg by mouth 2 times daily., Disp: , Rfl:     cholecalciferol 50 MCG (2000 UT) CAPS, Take 2,000 Units by mouth daily., Disp: , Rfl:     ferrous gluconate (FERGON) 324 (38 Fe) MG tablet, Take 324 mg by mouth daily (with breakfast)., Disp: , Rfl:     Methylcobalamin 1000 MCG SUBL, Place 1 tablet under the tongue every 7 days., Disp: , Rfl:     metoprolol succinate ER (TOPROL XL) 50 MG 24 hr tablet, Take 50 mg by mouth daily., Disp: , Rfl:     Documentation Date:1/28/2025 4:15 PM  Naomi Martinez RN    "

## 2025-01-28 NOTE — PROGRESS NOTES
HEART CARE ENCOUNTER CONSULTATON NOTE      [facility]  [phone]      Assessment/Recommendations   Assessment/Plan:    Isabelle Gaxiola is a very pleasant 54 year old female with history of paroxysmal atrial fibrillation, hypertension, hypothyroid, iron deficiency anemia, depression, obstructive sleep apnea, menorrhagia, and morbid obesity status post Angie-en-Y gastric bypass surgery completed in 1999 who presents today to the EP clinic.    Paroxysmal atrial fibrillation  - We reviewed the pathophysiology of atrial fibrillation and management considerations including stroke risk and anticoagulation, rate control, cardioversion, antiarrhythmic drug therapy, and catheter ablation. We discussed atrial fibrillation ablation procedures, anticipated success rates, the potential need for re-do ablation vs addition of anti-arrhythmic drugs, procedural risks (including groin bleeding, tamponade, phrenic or esophageal injury, stroke, pulmonary vein stenosis) and recovery expectations.  - will plan an ablation given recurrent symptoms despite meds  - we discussed the ongoing importance of lifestyle modification (maintaining a healthy weight, sleep apnea diagnosis and management, alcohol avoidance) as part of a long term strategy for atrial fibrillation management    2. Anticoagulation  - CHADSVASC score 2  - On Eliquis 5 mg BID    3. SAMI  - untreated, because she was not able to tolerate CPAP  - repeat sleep specialist referral placed    4. HTN  - high today in clinic  - has an appointment with PCP next week    5. Obesity   - counseled on weight loss      Time spent: 60 minutes spent on the date of the encounter doing chart review, history and exam, documentation and further activities as noted above.    The longitudinal plan of care for the condition(s) below were addressed during this visit. Due to the added complexity in care, I will continue support in the subsequent management of this  "condition(s) and with the ongoing continuity of care of this condition(s).        History of Present Illness/Subjective    HPI: Isabelle Gaxiola is a very pleasant 54 year old female with history of paroxysmal atrial fibrillation, hypertension, hypothyroid, iron deficiency anemia, depression, obstructive sleep apnea, menorrhagia, and morbid obesity status post Angie-en-Y gastric bypass surgery completed in 1999 who presents today to the EP clinic.    Isabelle was diagnosed with paroxysmal atrial fibrillation in 2021. Her symptoms of atrial fibrillation are heart racing/palpitations and neck fullness. Over the last few years she has had 3 cardioversions despite being on meds.      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      Alcohol use - none    She was diagnosed with SAMI which is currently untreated, because she was not able to tolerate CPAP.    Labs below reviewed personally     Physical Examination  Review of Systems   Vitals: BP (!) 152/84 (BP Location: Right arm, Patient Position: Sitting, Cuff Size: Adult Large)   Pulse 80   Ht 1.676 m (5' 6\")   Wt (!) 147.9 kg (326 lb)   BMI 52.62 kg/m    BMI= Body mass index is 52.62 kg/m .  Wt Readings from Last 3 Encounters:   01/28/25 (!) 147.9 kg (326 lb)   01/12/25 149.7 kg (330 lb)   01/08/25 149.7 kg (330 lb)       General Appearance:   no distress, normal body habitus   ENT/Mouth: membranes moist, no oral lesions or bleeding gums.      EYES:  no scleral icterus, normal conjunctivae   Neck: no carotid bruits or thyromegaly   Chest/Lungs:   lungs are clear to auscultation, no rales or wheezing, no sternal scar, equal chest wall expansion    Cardiovascular:   Regular. Normal first and second heart sounds with no murmurs, rubs, or gallops; the carotid, radial and posterior tibial pulses are intact, no edema bilaterally    Abdomen:  no organomegaly, masses, bruits, or tenderness; bowel sounds are present   Extremities: no cyanosis or " clubbing   Skin: no xanthelasma, warm.    Neurologic: normal  bilateral, no tremors     Psychiatric: alert and oriented x3, calm        Please refer above for cardiac ROS details.        Medical History  Surgical History Family History Social History   No past medical history on file.  No past surgical history on file.  No family history on file.     Social History     Socioeconomic History    Marital status: Single     Spouse name: Not on file    Number of children: Not on file    Years of education: Not on file    Highest education level: Not on file   Occupational History    Not on file   Tobacco Use    Smoking status: Former     Types: Cigarettes    Smokeless tobacco: Never   Substance and Sexual Activity    Alcohol use: Not on file    Drug use: Not on file    Sexual activity: Not on file   Other Topics Concern    Not on file   Social History Narrative    Not on file     Social Drivers of Health     Financial Resource Strain: Not on file   Food Insecurity: Not on file   Transportation Needs: Not on file   Physical Activity: Not on file   Stress: Not on file   Social Connections: Not on file   Interpersonal Safety: Not on file   Housing Stability: Not on file           Medications  Allergies   Current Outpatient Medications   Medication Sig Dispense Refill    albuterol (PROAIR HFA/PROVENTIL HFA/VENTOLIN HFA) 108 (90 Base) MCG/ACT inhaler Inhale 1-2 puffs into the lungs every 4 hours as needed for shortness of breath or wheezing.      apixaban ANTICOAGULANT (ELIQUIS) 5 MG tablet Take 5 mg by mouth 2 times daily.      cholecalciferol 50 MCG (2000 UT) CAPS Take 2,000 Units by mouth daily.      ferrous gluconate (FERGON) 324 (38 Fe) MG tablet Take 324 mg by mouth daily (with breakfast).      Methylcobalamin 1000 MCG SUBL Place 1 tablet under the tongue every 7 days.      metoprolol succinate ER (TOPROL XL) 50 MG 24 hr tablet Take 50 mg by mouth daily.      buPROPion (WELLBUTRIN SR) 150 MG 12 hr tablet Take 150 mg  "by mouth 2 times daily.         Allergies   Allergen Reactions    Prednisone Other (See Comments)     \"Makes me angry\"    Keflex [Cephalexin] Rash          Lab Results    Chemistry/lipid CBC Cardiac Enzymes/BNP/TSH/INR   No results for input(s): \"CHOL\", \"HDL\", \"LDL\", \"TRIG\", \"CHOLHDLRATIO\" in the last 45660 hours.  No results for input(s): \"LDL\" in the last 69867 hours.  Recent Labs   Lab Test 01/12/25  1517      POTASSIUM 4.1   CHLORIDE 106   CO2 25   GLC 97   BUN 11.7   CR 0.66   GFRESTIMATED >90   VIOLETA 9.6     Recent Labs   Lab Test 01/12/25  1517 01/08/25  1936   CR 0.66 0.66     No results for input(s): \"A1C\" in the last 67475 hours.       Recent Labs   Lab Test 01/12/25  1517   WBC 8.8   HGB 13.0   HCT 41.9   MCV 72*        Recent Labs   Lab Test 01/12/25  1517 01/08/25  1936   HGB 13.0 13.7    No results for input(s): \"TROPONINI\" in the last 09066 hours.  Recent Labs   Lab Test 01/08/25  1936   NTBNPI 124     Recent Labs   Lab Test 01/08/25 1936   TSH 4.92*     No results for input(s): \"INR\" in the last 59198 hours.     Juliano Shah MD                                      "

## 2025-01-28 NOTE — H&P (VIEW-ONLY)
HEART CARE ENCOUNTER CONSULTATON NOTE      [facility]  [phone]      Assessment/Recommendations   Assessment/Plan:    Isabelle Gaxiola is a very pleasant 54 year old female with history of paroxysmal atrial fibrillation, hypertension, hypothyroid, iron deficiency anemia, depression, obstructive sleep apnea, menorrhagia, and morbid obesity status post Angie-en-Y gastric bypass surgery completed in 1999 who presents today to the EP clinic.    Paroxysmal atrial fibrillation  - We reviewed the pathophysiology of atrial fibrillation and management considerations including stroke risk and anticoagulation, rate control, cardioversion, antiarrhythmic drug therapy, and catheter ablation. We discussed atrial fibrillation ablation procedures, anticipated success rates, the potential need for re-do ablation vs addition of anti-arrhythmic drugs, procedural risks (including groin bleeding, tamponade, phrenic or esophageal injury, stroke, pulmonary vein stenosis) and recovery expectations.  - will plan an ablation given recurrent symptoms despite meds  - we discussed the ongoing importance of lifestyle modification (maintaining a healthy weight, sleep apnea diagnosis and management, alcohol avoidance) as part of a long term strategy for atrial fibrillation management    2. Anticoagulation  - CHADSVASC score 2  - On Eliquis 5 mg BID    3. SAMI  - untreated, because she was not able to tolerate CPAP  - repeat sleep specialist referral placed    4. HTN  - high today in clinic  - has an appointment with PCP next week    5. Obesity   - counseled on weight loss      Time spent: 60 minutes spent on the date of the encounter doing chart review, history and exam, documentation and further activities as noted above.    The longitudinal plan of care for the condition(s) below were addressed during this visit. Due to the added complexity in care, I will continue support in the subsequent management of this  "condition(s) and with the ongoing continuity of care of this condition(s).        History of Present Illness/Subjective    HPI: Isabelle Gaxiola is a very pleasant 54 year old female with history of paroxysmal atrial fibrillation, hypertension, hypothyroid, iron deficiency anemia, depression, obstructive sleep apnea, menorrhagia, and morbid obesity status post Angie-en-Y gastric bypass surgery completed in 1999 who presents today to the EP clinic.    Isabelle was diagnosed with paroxysmal atrial fibrillation in 2021. Her symptoms of atrial fibrillation are heart racing/palpitations and neck fullness. Over the last few years she has had 3 cardioversions despite being on meds.      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      Alcohol use - none    She was diagnosed with SAMI which is currently untreated, because she was not able to tolerate CPAP.    Labs below reviewed personally     Physical Examination  Review of Systems   Vitals: BP (!) 152/84 (BP Location: Right arm, Patient Position: Sitting, Cuff Size: Adult Large)   Pulse 80   Ht 1.676 m (5' 6\")   Wt (!) 147.9 kg (326 lb)   BMI 52.62 kg/m    BMI= Body mass index is 52.62 kg/m .  Wt Readings from Last 3 Encounters:   01/28/25 (!) 147.9 kg (326 lb)   01/12/25 149.7 kg (330 lb)   01/08/25 149.7 kg (330 lb)       General Appearance:   no distress, normal body habitus   ENT/Mouth: membranes moist, no oral lesions or bleeding gums.      EYES:  no scleral icterus, normal conjunctivae   Neck: no carotid bruits or thyromegaly   Chest/Lungs:   lungs are clear to auscultation, no rales or wheezing, no sternal scar, equal chest wall expansion    Cardiovascular:   Regular. Normal first and second heart sounds with no murmurs, rubs, or gallops; the carotid, radial and posterior tibial pulses are intact, no edema bilaterally    Abdomen:  no organomegaly, masses, bruits, or tenderness; bowel sounds are present   Extremities: no cyanosis or " clubbing   Skin: no xanthelasma, warm.    Neurologic: normal  bilateral, no tremors     Psychiatric: alert and oriented x3, calm        Please refer above for cardiac ROS details.        Medical History  Surgical History Family History Social History   No past medical history on file.  No past surgical history on file.  No family history on file.     Social History     Socioeconomic History    Marital status: Single     Spouse name: Not on file    Number of children: Not on file    Years of education: Not on file    Highest education level: Not on file   Occupational History    Not on file   Tobacco Use    Smoking status: Former     Types: Cigarettes    Smokeless tobacco: Never   Substance and Sexual Activity    Alcohol use: Not on file    Drug use: Not on file    Sexual activity: Not on file   Other Topics Concern    Not on file   Social History Narrative    Not on file     Social Drivers of Health     Financial Resource Strain: Not on file   Food Insecurity: Not on file   Transportation Needs: Not on file   Physical Activity: Not on file   Stress: Not on file   Social Connections: Not on file   Interpersonal Safety: Not on file   Housing Stability: Not on file           Medications  Allergies   Current Outpatient Medications   Medication Sig Dispense Refill    albuterol (PROAIR HFA/PROVENTIL HFA/VENTOLIN HFA) 108 (90 Base) MCG/ACT inhaler Inhale 1-2 puffs into the lungs every 4 hours as needed for shortness of breath or wheezing.      apixaban ANTICOAGULANT (ELIQUIS) 5 MG tablet Take 5 mg by mouth 2 times daily.      cholecalciferol 50 MCG (2000 UT) CAPS Take 2,000 Units by mouth daily.      ferrous gluconate (FERGON) 324 (38 Fe) MG tablet Take 324 mg by mouth daily (with breakfast).      Methylcobalamin 1000 MCG SUBL Place 1 tablet under the tongue every 7 days.      metoprolol succinate ER (TOPROL XL) 50 MG 24 hr tablet Take 50 mg by mouth daily.      buPROPion (WELLBUTRIN SR) 150 MG 12 hr tablet Take 150 mg  "by mouth 2 times daily.         Allergies   Allergen Reactions    Prednisone Other (See Comments)     \"Makes me angry\"    Keflex [Cephalexin] Rash          Lab Results    Chemistry/lipid CBC Cardiac Enzymes/BNP/TSH/INR   No results for input(s): \"CHOL\", \"HDL\", \"LDL\", \"TRIG\", \"CHOLHDLRATIO\" in the last 41279 hours.  No results for input(s): \"LDL\" in the last 46519 hours.  Recent Labs   Lab Test 01/12/25  1517      POTASSIUM 4.1   CHLORIDE 106   CO2 25   GLC 97   BUN 11.7   CR 0.66   GFRESTIMATED >90   VIOLETA 9.6     Recent Labs   Lab Test 01/12/25  1517 01/08/25  1936   CR 0.66 0.66     No results for input(s): \"A1C\" in the last 83867 hours.       Recent Labs   Lab Test 01/12/25  1517   WBC 8.8   HGB 13.0   HCT 41.9   MCV 72*        Recent Labs   Lab Test 01/12/25  1517 01/08/25  1936   HGB 13.0 13.7    No results for input(s): \"TROPONINI\" in the last 24563 hours.  Recent Labs   Lab Test 01/08/25  1936   NTBNPI 124     Recent Labs   Lab Test 01/08/25 1936   TSH 4.92*     No results for input(s): \"INR\" in the last 35270 hours.     Juliano Shah MD                                      "

## 2025-01-28 NOTE — LETTER
1/28/2025    Sowmya Hanna MD  [facility] [address] [facility] [address] [place] [address]    RE: Isabelle Gaxiola       Dear Colleague,     I had the [facility] of [facility] Isabel[facility].    HEART CARE ENCOUNTER CONSULTATON NOTE      [facility]  [phone]      Assessment/Recommendations   Assessment/Plan:    Isabelle Gaxiola is a very pleasant 54 year old female with history of paroxysmal atrial fibrillation, hypertension, hypothyroid, iron deficiency anemia, depression, obstructive sleep apnea, menorrhagia, and morbid obesity status post Angie-en-Y gastric bypass surgery completed in 1999 who presents today to the EP clinic.    Paroxysmal atrial fibrillation  - We reviewed the pathophysiology of atrial fibrillation and management considerations including stroke risk and anticoagulation, rate control, cardioversion, antiarrhythmic drug therapy, and catheter ablation. We discussed atrial fibrillation ablation procedures, anticipated success rates, the potential need for re-do ablation vs addition of anti-arrhythmic drugs, procedural risks (including groin bleeding, tamponade, phrenic or esophageal injury, stroke, pulmonary vein stenosis) and recovery expectations.  - will plan an ablation given recurrent symptoms despite meds  - we discussed the ongoing importance of lifestyle modification (maintaining a healthy weight, sleep apnea diagnosis and management, alcohol avoidance) as part of a long term strategy for atrial fibrillation management    2. Anticoagulation  - CHADSVASC score 2  - On Eliquis 5 mg BID    3. SAMI  - untreated, because she was not able to tolerate CPAP  - repeat sleep specialist referral placed    4. HTN  - high today in clinic  - has an appointment with PCP next week    5. Obesity   - counseled on weight loss      Time spent: 60 minutes spent on the date of the encounter doing chart review, history and exam, documentation  "and further activities as noted above.    The longitudinal plan of care for the condition(s) below were addressed during this visit. Due to the added complexity in care, I will continue support in the subsequent management of this condition(s) and with the ongoing continuity of care of this condition(s).        History of Present Illness/Subjective    HPI: Isabelle Gaxiola is a very pleasant 54 year old female with history of paroxysmal atrial fibrillation, hypertension, hypothyroid, iron deficiency anemia, depression, obstructive sleep apnea, menorrhagia, and morbid obesity status post Angie-en-Y gastric bypass surgery completed in 1999 who presents today to the EP clinic.    Isabelle was diagnosed with paroxysmal atrial fibrillation in 2021. Her symptoms of atrial fibrillation are heart racing/palpitations and neck fullness. Over the last few years she has had 3 cardioversions despite being on meds.      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      Alcohol use - none    She was diagnosed with SAMI which is currently untreated, because she was not able to tolerate CPAP.    Labs below reviewed personally     Physical Examination  Review of Systems   Vitals: BP (!) 152/84 (BP Location: Right arm, Patient Position: Sitting, Cuff Size: Adult Large)   Pulse 80   Ht 1.676 m (5' 6\")   Wt (!) 147.9 kg (326 lb)   BMI 52.62 kg/m    BMI= Body mass index is 52.62 kg/m .  Wt Readings from Last 3 Encounters:   01/28/25 (!) 147.9 kg (326 lb)   01/12/25 149.7 kg (330 lb)   01/08/25 149.7 kg (330 lb)       General Appearance:   no distress, normal body habitus   ENT/Mouth: membranes moist, no oral lesions or bleeding gums.      EYES:  no scleral icterus, normal conjunctivae   Neck: no carotid bruits or thyromegaly   Chest/Lungs:   lungs are clear to auscultation, no rales or wheezing, no sternal scar, equal chest wall expansion    Cardiovascular:   Regular. Normal first and second heart sounds with " no murmurs, rubs, or gallops; the carotid, radial and posterior tibial pulses are intact, no edema bilaterally    Abdomen:  no organomegaly, masses, bruits, or tenderness; bowel sounds are present   Extremities: no cyanosis or clubbing   Skin: no xanthelasma, warm.    Neurologic: normal  bilateral, no tremors     Psychiatric: alert and oriented x3, calm        Please refer above for cardiac ROS details.        Medical History  Surgical History Family History Social History   No past medical history on file.  No past surgical history on file.  No family history on file.     Social History     Socioeconomic History     Marital status: Single     Spouse name: Not on file     Number of children: Not on file     Years of education: Not on file     Highest education level: Not on file   Occupational History     Not on file   Tobacco Use     Smoking status: Former     Types: Cigarettes     Smokeless tobacco: Never   Substance and Sexual Activity     Alcohol use: Not on file     Drug use: Not on file     Sexual activity: Not on file   Other Topics Concern     Not on file   Social History Narrative     Not on file     Social Drivers of Health     Financial Resource Strain: Not on file   Food Insecurity: Not on file   Transportation Needs: Not on file   Physical Activity: Not on file   Stress: Not on file   Social Connections: Not on file   Interpersonal Safety: Not on file   Housing Stability: Not on file           Medications  Allergies   Current Outpatient Medications   Medication Sig Dispense Refill     albuterol (PROAIR HFA/PROVENTIL HFA/VENTOLIN HFA) 108 (90 Base) MCG/ACT inhaler Inhale 1-2 puffs into the lungs every 4 hours as needed for shortness of breath or wheezing.       apixaban ANTICOAGULANT (ELIQUIS) 5 MG tablet Take 5 mg by mouth 2 times daily.       cholecalciferol 50 MCG (2000 UT) CAPS Take 2,000 Units by mouth daily.       ferrous gluconate (FERGON) 324 (38 Fe) MG tablet Take 324 mg by mouth daily (with  "breakfast).       Methylcobalamin 1000 MCG SUBL Place 1 tablet under the tongue every 7 days.       metoprolol succinate ER (TOPROL XL) 50 MG 24 hr tablet Take 50 mg by mouth daily.       buPROPion (WELLBUTRIN SR) 150 MG 12 hr tablet Take 150 mg by mouth 2 times daily.         Allergies   Allergen Reactions     Prednisone Other (See Comments)     \"Makes me angry\"     Keflex [Cephalexin] Rash          Lab Results    Chemistry/lipid CBC Cardiac Enzymes/BNP/TSH/INR   No results for input(s): \"CHOL\", \"HDL\", \"LDL\", \"TRIG\", \"CHOLHDLRATIO\" in the last 83482 hours.  No results for input(s): \"LDL\" in the last 70587 hours.  Recent Labs   Lab Test 01/12/25  1517      POTASSIUM 4.1   CHLORIDE 106   CO2 25   GLC 97   BUN 11.7   CR 0.66   GFRESTIMATED >90   VIOLETA 9.6     Recent Labs   Lab Test 01/12/25  1517 01/08/25  1936   CR 0.66 0.66     No results for input(s): \"A1C\" in the last 44430 hours.       Recent Labs   Lab Test 01/12/25  1517   WBC 8.8   HGB 13.0   HCT 41.9   MCV 72*        Recent Labs   Lab Test 01/12/25  1517 01/08/25  1936   HGB 13.0 13.7    No results for input(s): \"TROPONINI\" in the last 76090 hours.  Recent Labs   Lab Test 01/08/25  1936   NTBNPI 124     Recent Labs   Lab Test 01/08/25  1936   TSH 4.92*     No results for input(s): \"INR\" in the last 62687 hours.     Juliano Shah MD                                        Thank you for allowing me to participate in the care of your patient.      Sincerely,     Juliano Shah MD     [facility]  cc:   Yuliya Pak PA-C  EMERGENCY CARE  [address]  [place] [address]      "

## 2025-01-28 NOTE — PATIENT INSTRUCTIONS
[facility]  Cardiac Electrophysiology  [address]  [place] [address]   Office: [phone]  Fax: [phone]       Thank you for seeing us in clinic today - it is a pleasure to be a part of your care team.  Below is a summary of our plan from today's visit.       You have paroxysmal atrial fibrillation, presently being managed with metoprolol.  We reviewed physiology and management options including antiarrhythmic drug therapy, catheter ablation and lifestyle modification.  We will plan for the following:  - consider options further, and let us know with any questions or decision as to how you would like to proceed  - continue metoprolol for now  - continue Eliquis     Please do not hesitate to be in touch with our office at [phone] with any questions that may arise.       Thank you for trusting us with your care,    Juliano Shah MD  Clinical Cardiac Electrophysiology  [facility]  [address]  [place] [address]   Office: [phone]  Fax: [phone]                 ATRIAL FIBRILLATION: Patient Information     What is atrial fibrillation?  Atrial fibrillation (AF, A-fib) is a common heart rhythm problem (arrhythmia) occurring within the upper chambers of the heart (the atria).  In normal rhythm, the upper and lower chambers of the heart are electrically driven to contract in a coordinated sequence.  In atrial fibrillation, the atria lose their ability to contract because of rapid and chaotic electrical activity.  The lower chambers of the heart (the ventricles) continue to pump blood throughout the body, though with irregular and often faster rate due to the chaotic activity within the atria.          How do I know if I have atrial fibrillation?   Some people may feel their heart beating faster, harder, or irregularly while in atrial fibrillation.  Others may be lightheaded, fatigued, feel weak or tired or become more  short of breath especially with activities.  Some patients have no symptoms at all.  Atrial fibrillation may be found due to an irregular pulse or on an electrocardiogram (ECG). Atrial fibrillation can start and stop on its own, and episodes can last from seconds to several months.       How common is atrial fibrillation?   An estimated 3-6 million people in the [place] have atrial fibrillation.  Atrial fibrillation is a common heart rhythm problem for older persons, affecting as estimated 12-15% of people over the age of 65 years of age.     What causes atrial fibrillation?   Age is the most important risk factor for atrial fibrillation.  Atrial fibrillation is more common in people with other heart disease, high blood pressure, diabetes, obesity, sleep apnea and in people who regularly consume alcohol.  Surgery, lung disease, or thyroid problems can lead to atrial fibrillation.  Atrial fibrillation has multiple possible causes, and in most cases a single cause cannot be found.  Atrial fibrillation is a progressive condition, usually starting with at an early stage with short and infrequent episodes.  In later stages of disease, more frequent and longer lasting episodes of atrial fibrillation occur, ultimately culminating in episodes which do not spontaneously terminate.  Generally, more enlargement and scarring within the upper chambers of the heart is observed as atrial fibrillation progresses from early to late-stage disease.     How is atrial fibrillation diagnosed and evaluated?    Because of its start-stop nature, atrial fibrillation can be challenging to diagnose.  Atrial fibrillation is most commonly diagnosed via cardiac rhythm recordings - either an ECG or wearable cardiac rhythm monitor.  For patients with pacemakers, defibrillators or implantable loop recorders, atrial fibrillation may be recorded via these devices.  Recently, commercially available devices (eg. Apple Watch, Kardia device, certain  [employer] devices, others) can allow patients to take 30 second cardiac rhythm recordings which may document atrial fibrillation.  Once atrial fibrillation is diagnosed, additional tests include blood tests and an echocardiogram.  The echocardiogram uses ultrasound to look at your heart to assess your cardiac function and evaluate for other heart disease.  Additional evaluation may include CT or MRI studies.     Is atrial fibrillation dangerous?   Atrial fibrillation is not usually a life-threatening arrhythmia.  The most serious consequences of atrial fibrillation including stroke and worsening of overall cardiac function.  While in atrial fibrillation, the upper cardiac chambers do not contract normally, resulting in slower blood flow and increased risk of clot formation.  If this blood clot becomes detached from the heart a stroke can occur.  Unfortunately, stroke can be the first sign of atrial fibrillation for some people.  With a stroke, you may notice abnormal sensation, weakness on one side of the body or face, changes in your vision or speech.  If you have any of these signs, you should contact EMS and be evaluated in an emergency room as soon as possible.       How is atrial fibrillation treated?     Several treatment options exist for suppressing atrial fibrillation - however, it is not an easily curable arrhythmia.  The first goal in managing atrial fibrillation is to minimize stroke risk.  The second goal is to improve symptoms associated with atrial fibrillation.  Finally, in patients with reduced cardiac function, maintaining normal rhythm can help improve cardiac function.       Blood thinners are used to reduce the risk of stroke in patients with high estimated stroke risk related to atrial fibrillation.  For patients at higher risk of bleeding related to blood thinner use, implantable devices may be an option to reduce stroke risk without the need for long term blood thinner use.       Atrial  fibrillation can be managed via two strategies: rate control and rhythm control.  In a rate control strategy, continued atrial fibrillation is accepted and medications (eg. beta-blockers or calcium channel blockers) are used to control the lower chamber rate.  In a rhythm control strategy, anti-arrhythmic medications or catheter ablation are used to maintain normal cardiac rhythm and slow disease progression by suppressing atrial fibrillation.  A procedure called a cardioversion, in which an electric shock is delivered through patches placed on the chest wall while under deep sedation, can be performed to temporarily restore normal cardiac rhythm, though does not address the chance of atrial fibrillation recurrence.  Treatments are more effective for earlier rather than later stage atrial fibrillation.  Lifestyle modifications (maintaining a healthy weight, aerobic exercise, diagnosing and treating sleep apnea, and minimizing alcohol intake) are important elements of atrial fibrillation rhythm control.      What is catheter ablation for atrial fibrillation?  Cardiac catheter ablation is a commonly performed, minimally invasive procedure performed by a cardiac electrophysiologist to treat many different cardiac rhythm abnormalities.  During catheter ablation, long, thin, flexible tubes are advanced into the heart via small sheaths inserted into the femoral veins and thermal energy (either heating or cooling) is applied within the heart to disrupt abnormal electrical activity.  Atrial fibrillation ablation is performed under general anesthesia, with procedures generally taking approximately 2-3 hours.  Patients are typically observed for 3-5 hours after the ablation, and in most cases can be discharged home the same day.  Atrial fibrillation ablation is associated with better outcomes (mortality, cardiovascular hospitalizations, atrial arrhythmia recurrences) compared to antiarrhythmic drug therapy.  However, atrial  fibrillation recurrences are not uncommon, and repeat catheter ablation may be offered.  Your electrophysiology team can review atrial fibrillation ablation, anticipated success rates, risks, and recovery expectations with you.     What are anti-arrhythmic medications?  Anti-arrhythmic medications are specialized drugs which alter cardiac electrical functioning to suppress arrhythmias.  There are several anti-arrhythmic medications available, each with its own success rate and side effects.  Some anti-arrhythmic medications are less effective though safer to use, others are more effective though have serious potential toxicities.  Atrial fibrillation recurrences are common and may require dose adjustment or change in antiarrhythmic therapy.  Your electrophysiology team will carefully consider which medication would be the best and safest for your particular case.       Can I live a normal life?    The goal of atrial fibrillation management is for patients to live normal lives without being limited by symptoms related to atrial fibrillation.     Are any additional educational resources available?  There are a number of excellent atrial fibrillation education resources available to you online.  A few options you may wish to review include:  hrsonline.org/guide-atrial-fibrillation  afibmatters.org  getsmartaboutafib.com  stopaf.com     What comes next?    Consider your management options and let us know how we can help in your decision process.  Please take medications as they have been prescribed.  You should also get any tests that may have been ordered for you.       When to Call Your Doctor or seek emergency care:  Call your doctor or seek emergency care if you have any significant changes with the following:  Weakness  Dizziness  Fainting  Fatigue  Shortness of breath  Chest pain with increased activity  If you are concerned that your heart rate is too fast or too slow  Bleeding that does not stop in 10  minutes  Coughing or throwing up blood  Bloody diarrhea or bleeding hemorrhoids  Dark-colored urine or black stool  Allergic reactions:  Rash  Itching  Swelling  Trouble breathing or swallowing        Please call the [facility] at [phone] if you have concerns about your symptoms, your medicines, or your follow-up appointments.

## 2025-01-29 DIAGNOSIS — I48.91 ATRIAL FIBRILLATION WITH RAPID VENTRICULAR RESPONSE (H): Primary | ICD-10-CM

## 2025-02-05 ENCOUNTER — ALLIED HEALTH/NURSE VISIT (OUTPATIENT)
Dept: CARDIOLOGY | Facility: CLINIC | Age: 55
End: 2025-02-05
Payer: COMMERCIAL

## 2025-02-05 ENCOUNTER — LAB (OUTPATIENT)
Dept: CARDIOLOGY | Facility: CLINIC | Age: 55
End: 2025-02-05
Payer: COMMERCIAL

## 2025-02-05 VITALS
SYSTOLIC BLOOD PRESSURE: 128 MMHG | DIASTOLIC BLOOD PRESSURE: 62 MMHG | HEART RATE: 60 BPM | HEIGHT: 66 IN | WEIGHT: 293 LBS | BODY MASS INDEX: 47.09 KG/M2 | RESPIRATION RATE: 16 BRPM

## 2025-02-05 DIAGNOSIS — I48.91 ATRIAL FIBRILLATION WITH RAPID VENTRICULAR RESPONSE (H): ICD-10-CM

## 2025-02-05 DIAGNOSIS — I48.91 ATRIAL FIBRILLATION WITH RAPID VENTRICULAR RESPONSE (H): Primary | ICD-10-CM

## 2025-02-05 LAB
ANION GAP SERPL CALCULATED.3IONS-SCNC: 12 MMOL/L (ref 7–15)
ATRIAL RATE - MUSE: 60 BPM
BUN SERPL-MCNC: 23.6 MG/DL (ref 6–20)
CALCIUM SERPL-MCNC: 9.5 MG/DL (ref 8.8–10.4)
CHLORIDE SERPL-SCNC: 106 MMOL/L (ref 98–107)
CREAT SERPL-MCNC: 0.67 MG/DL (ref 0.51–0.95)
DIASTOLIC BLOOD PRESSURE - MUSE: NORMAL MMHG
EGFRCR SERPLBLD CKD-EPI 2021: >90 ML/MIN/1.73M2
ERYTHROCYTE [DISTWIDTH] IN BLOOD BY AUTOMATED COUNT: 20.7 % (ref 10–15)
GLUCOSE SERPL-MCNC: 108 MG/DL (ref 70–99)
HCO3 SERPL-SCNC: 23 MMOL/L (ref 22–29)
HCT VFR BLD AUTO: 47.1 % (ref 35–47)
HGB BLD-MCNC: 14.3 G/DL (ref 11.7–15.7)
INTERPRETATION ECG - MUSE: NORMAL
MCH RBC QN AUTO: 22.8 PG (ref 26.5–33)
MCHC RBC AUTO-ENTMCNC: 30.4 G/DL (ref 31.5–36.5)
MCV RBC AUTO: 75 FL (ref 78–100)
P AXIS - MUSE: 9 DEGREES
PLATELET # BLD AUTO: 284 10E3/UL (ref 150–450)
POTASSIUM SERPL-SCNC: 4.4 MMOL/L (ref 3.4–5.3)
PR INTERVAL - MUSE: 148 MS
QRS DURATION - MUSE: 82 MS
QT - MUSE: 390 MS
QTC - MUSE: 390 MS
R AXIS - MUSE: -13 DEGREES
RBC # BLD AUTO: 6.27 10E6/UL (ref 3.8–5.2)
SODIUM SERPL-SCNC: 141 MMOL/L (ref 135–145)
SYSTOLIC BLOOD PRESSURE - MUSE: NORMAL MMHG
T AXIS - MUSE: 18 DEGREES
VENTRICULAR RATE- MUSE: 60 BPM
WBC # BLD AUTO: 9.1 10E3/UL (ref 4–11)

## 2025-02-05 PROCEDURE — 93000 ELECTROCARDIOGRAM COMPLETE: CPT | Performed by: INTERNAL MEDICINE

## 2025-02-05 PROCEDURE — 99207 PR NO CHARGE NURSE ONLY: CPT

## 2025-02-05 PROCEDURE — 80048 BASIC METABOLIC PNL TOTAL CA: CPT

## 2025-02-05 PROCEDURE — 36415 COLL VENOUS BLD VENIPUNCTURE: CPT

## 2025-02-05 PROCEDURE — 85027 COMPLETE CBC AUTOMATED: CPT

## 2025-02-05 NOTE — PROGRESS NOTES
Pre-Procedure Pulmonary Vein Ablation (AF) Education    Procedure: PVI with Dr Shah on 2/13/25 with arrival time 6:30 am    COVID: Pt denies COVID like symptoms, and is aware if he/she develops COVID like symptoms they would need to complete an at home with a rapid antigen COVID test 1-2 days prior to your procedure date. If COVID + pt is aware the procedure will need to be rescheduled, and to contact CV scheduling as soon as possible    Type & Screen: Is not required for PVI Ablation    Pre-Op H&P: Completed on 1/28/25 in Epic    Education:   Reviewed with pt in Clinic today  Pre-Procedure Instruction: NPO after midnight pre procedure, Defined NPO, Remove all jewelry and leave all valuables at home, Shower prior to arrival, Anesthesia and intubation plan/orders, Intra-procedure PVI process, Post- PVI procedure expectations/recovery, Transportation requirements and arrangements post procedure, Post-procedure follow up process, Letter sent to pt via [employer] and mail with written instructions (Refer to letters tab), Lab results would be called to pt if abnormal  Risks:   Atrial Fibrillation Ablation/Left Atrial Ablation  Cardiac Ablation  <1% Hypotension, Hemorrhage, Thrombophlebitis, Systemic or pulmonic emboli, Cardiac perforation (tamponade), Infection, Pneumothorax, Arrhythmias, Proarrhythmic effects of drugs, Radiation exposure, Catheter entrapment  <1 % Vascular injury including perforation of vein, artery or heart  1-2% Tamponade and Aortic puncture with left sided transeptal approach  1% CVA   <1% MI  <0.1% death  If external defibrillation or CV is needed, 25% risk for superficial burn  Risks associated with general anesthesia will be addressed by the Anesthesiology Department  Radiofrequency Risks:  In addition to standard risks for Radiofrequency Ablation, there is:  <2% Significant pulmonary vein stenosis  <2% Embolic events  <1% Esophageal fistula  <1% Phrenic nerve paralysis   Cryoablation  Risks:  In addition to standard risks for Cryoablation Ablation, there is:  <1% Phrenic nerve paralysis  <1% Pulmonary vein stenosis  <1% Esophageal fistula    Medication:   Instructions regarding anticoagulants: Eliquis- Continue anticoagulation uninterrupted through their procedure, do not miss any doses of AC prior to procedure, importance of taking AC for stroke prevention, taking AC as prescribed, to call prior to PVI if missed a dose of AC, and if upon arrival pt reports missing a dose of AC PVI will potentially be cnx/postponed  Instructions given to pt regarding antiarrhythmic medication:  Metoprolol - hold 3 days prior to procedure, and will be started on 2/10/25  Instructions given to pt regarding diuretics medication: None  Instructions given to pt regarding DM/GLP-1 medication:   DM- None  GLP-1- None  Instructions for medication, other than anticoagulants and antiarrhythmics listed above, given to pt: Take all medication AM of procedure with small sips of water     Important patient information for staff: None    2/5/2025 2:57 PM  Kaitlin Murillo RN

## 2025-02-12 ENCOUNTER — ANESTHESIA EVENT (OUTPATIENT)
Dept: CARDIOLOGY | Facility: HOSPITAL | Age: 55
End: 2025-02-12
Payer: COMMERCIAL

## 2025-02-13 ENCOUNTER — ANESTHESIA (OUTPATIENT)
Dept: CARDIOLOGY | Facility: HOSPITAL | Age: 55
End: 2025-02-13
Payer: COMMERCIAL

## 2025-02-13 ENCOUNTER — HOSPITAL ENCOUNTER (OUTPATIENT)
Facility: HOSPITAL | Age: 55
Discharge: HOME | End: 2025-02-13
Attending: INTERNAL MEDICINE | Admitting: INTERNAL MEDICINE
Payer: COMMERCIAL

## 2025-02-13 VITALS
SYSTOLIC BLOOD PRESSURE: 140 MMHG | HEART RATE: 79 BPM | BODY MASS INDEX: 47.09 KG/M2 | RESPIRATION RATE: 16 BRPM | WEIGHT: 293 LBS | TEMPERATURE: 98.1 F | OXYGEN SATURATION: 98 % | HEIGHT: 66 IN | DIASTOLIC BLOOD PRESSURE: 74 MMHG

## 2025-02-13 DIAGNOSIS — I48.0 PAROXYSMAL A-FIB (H): ICD-10-CM

## 2025-02-13 DIAGNOSIS — I48.91 ATRIAL FIBRILLATION WITH RAPID VENTRICULAR RESPONSE (H): ICD-10-CM

## 2025-02-13 LAB
ACT BLD: 300 SECONDS (ref 74–150)
ACT BLD: 369 SECONDS (ref 74–150)
ACT BLD: 389 SECONDS (ref 74–150)
ANION GAP SERPL CALCULATED.3IONS-SCNC: 8 MMOL/L (ref 7–15)
ATRIAL RATE - MUSE: 76 BPM
BUN SERPL-MCNC: 15.9 MG/DL (ref 6–20)
CALCIUM SERPL-MCNC: 9.4 MG/DL (ref 8.8–10.4)
CHLORIDE SERPL-SCNC: 108 MMOL/L (ref 98–107)
CREAT SERPL-MCNC: 0.66 MG/DL (ref 0.51–0.95)
DIASTOLIC BLOOD PRESSURE - MUSE: NORMAL MMHG
EGFRCR SERPLBLD CKD-EPI 2021: >90 ML/MIN/1.73M2
ERYTHROCYTE [DISTWIDTH] IN BLOOD BY AUTOMATED COUNT: 20.3 % (ref 10–15)
GLUCOSE SERPL-MCNC: 125 MG/DL (ref 70–99)
HCG INTACT+B SERPL-ACNC: 1 MIU/ML
HCO3 SERPL-SCNC: 28 MMOL/L (ref 22–29)
HCT VFR BLD AUTO: 40.2 % (ref 35–47)
HGB BLD-MCNC: 12.5 G/DL (ref 11.7–15.7)
INTERPRETATION ECG - MUSE: NORMAL
MCH RBC QN AUTO: 23.8 PG (ref 26.5–33)
MCHC RBC AUTO-ENTMCNC: 31.1 G/DL (ref 31.5–36.5)
MCV RBC AUTO: 77 FL (ref 78–100)
P AXIS - MUSE: 44 DEGREES
PLATELET # BLD AUTO: 203 10E3/UL (ref 150–450)
POTASSIUM SERPL-SCNC: 3.8 MMOL/L (ref 3.4–5.3)
PR INTERVAL - MUSE: 140 MS
QRS DURATION - MUSE: 82 MS
QT - MUSE: 400 MS
QTC - MUSE: 450 MS
R AXIS - MUSE: 21 DEGREES
RBC # BLD AUTO: 5.25 10E6/UL (ref 3.8–5.2)
SODIUM SERPL-SCNC: 144 MMOL/L (ref 135–145)
SYSTOLIC BLOOD PRESSURE - MUSE: NORMAL MMHG
T AXIS - MUSE: 52 DEGREES
VENTRICULAR RATE- MUSE: 76 BPM
WBC # BLD AUTO: 5.2 10E3/UL (ref 4–11)

## 2025-02-13 PROCEDURE — 36415 COLL VENOUS BLD VENIPUNCTURE: CPT | Performed by: INTERNAL MEDICINE

## 2025-02-13 PROCEDURE — 85014 HEMATOCRIT: CPT | Performed by: INTERNAL MEDICINE

## 2025-02-13 PROCEDURE — 93656 COMPRE EP EVAL ABLTJ ATR FIB: CPT | Performed by: INTERNAL MEDICINE

## 2025-02-13 PROCEDURE — C1766 INTRO/SHEATH,STRBLE,NON-PEEL: HCPCS | Performed by: INTERNAL MEDICINE

## 2025-02-13 PROCEDURE — 93010 ELECTROCARDIOGRAM REPORT: CPT | Performed by: INTERNAL MEDICINE

## 2025-02-13 PROCEDURE — 710N000010 HC RECOVERY PHASE 1, LEVEL 2, PER MIN

## 2025-02-13 PROCEDURE — C1894 INTRO/SHEATH, NON-LASER: HCPCS | Performed by: INTERNAL MEDICINE

## 2025-02-13 PROCEDURE — C1732 CATH, EP, DIAG/ABL, 3D/VECT: HCPCS | Performed by: INTERNAL MEDICINE

## 2025-02-13 PROCEDURE — 999N000054 HC STATISTIC EKG NON-CHARGEABLE

## 2025-02-13 PROCEDURE — 93615 ESOPHAGEAL RECORDING: CPT | Performed by: INTERNAL MEDICINE

## 2025-02-13 PROCEDURE — C1730 CATH, EP, 19 OR FEW ELECT: HCPCS | Performed by: INTERNAL MEDICINE

## 2025-02-13 PROCEDURE — C1887 CATHETER, GUIDING: HCPCS | Performed by: INTERNAL MEDICINE

## 2025-02-13 PROCEDURE — 250N000011 HC RX IP 250 OP 636: Performed by: INTERNAL MEDICINE

## 2025-02-13 PROCEDURE — C1759 CATH, INTRA ECHOCARDIOGRAPHY: HCPCS | Performed by: INTERNAL MEDICINE

## 2025-02-13 PROCEDURE — C1733 CATH, EP, OTHR THAN COOL-TIP: HCPCS | Performed by: INTERNAL MEDICINE

## 2025-02-13 PROCEDURE — 258N000003 HC RX IP 258 OP 636: Performed by: NURSE ANESTHETIST, CERTIFIED REGISTERED

## 2025-02-13 PROCEDURE — 80048 BASIC METABOLIC PNL TOTAL CA: CPT | Performed by: INTERNAL MEDICINE

## 2025-02-13 PROCEDURE — 250N000013 HC RX MED GY IP 250 OP 250 PS 637: Performed by: INTERNAL MEDICINE

## 2025-02-13 PROCEDURE — 85347 COAGULATION TIME ACTIVATED: CPT

## 2025-02-13 PROCEDURE — 85041 AUTOMATED RBC COUNT: CPT | Performed by: INTERNAL MEDICINE

## 2025-02-13 PROCEDURE — C1769 GUIDE WIRE: HCPCS | Performed by: INTERNAL MEDICINE

## 2025-02-13 PROCEDURE — 250N000011 HC RX IP 250 OP 636: Performed by: NURSE ANESTHETIST, CERTIFIED REGISTERED

## 2025-02-13 PROCEDURE — 250N000009 HC RX 250: Performed by: NURSE ANESTHETIST, CERTIFIED REGISTERED

## 2025-02-13 PROCEDURE — 272N000001 HC OR GENERAL SUPPLY STERILE: Performed by: INTERNAL MEDICINE

## 2025-02-13 PROCEDURE — 93005 ELECTROCARDIOGRAM TRACING: CPT

## 2025-02-13 PROCEDURE — 370N000017 HC ANESTHESIA TECHNICAL FEE, PER MIN: Performed by: INTERNAL MEDICINE

## 2025-02-13 PROCEDURE — 84702 CHORIONIC GONADOTROPIN TEST: CPT | Performed by: INTERNAL MEDICINE

## 2025-02-13 RX ORDER — NALOXONE HYDROCHLORIDE 0.4 MG/ML
0.1 INJECTION, SOLUTION INTRAMUSCULAR; INTRAVENOUS; SUBCUTANEOUS
Status: DISCONTINUED | OUTPATIENT
Start: 2025-02-13 | End: 2025-02-13 | Stop reason: HOSPADM

## 2025-02-13 RX ORDER — METOPROLOL TARTRATE 1 MG/ML
INJECTION, SOLUTION INTRAVENOUS PRN
Status: DISCONTINUED | OUTPATIENT
Start: 2025-02-13 | End: 2025-02-13

## 2025-02-13 RX ORDER — PROPOFOL 10 MG/ML
INJECTION, EMULSION INTRAVENOUS PRN
Status: DISCONTINUED | OUTPATIENT
Start: 2025-02-13 | End: 2025-02-13

## 2025-02-13 RX ORDER — SODIUM CHLORIDE, SODIUM LACTATE, POTASSIUM CHLORIDE, CALCIUM CHLORIDE 600; 310; 30; 20 MG/100ML; MG/100ML; MG/100ML; MG/100ML
INJECTION, SOLUTION INTRAVENOUS CONTINUOUS
Status: DISCONTINUED | OUTPATIENT
Start: 2025-02-13 | End: 2025-02-13 | Stop reason: HOSPADM

## 2025-02-13 RX ORDER — ONDANSETRON 2 MG/ML
4 INJECTION INTRAMUSCULAR; INTRAVENOUS EVERY 6 HOURS PRN
Status: DISCONTINUED | OUTPATIENT
Start: 2025-02-13 | End: 2025-02-13 | Stop reason: HOSPADM

## 2025-02-13 RX ORDER — FENTANYL CITRATE 50 UG/ML
25 INJECTION, SOLUTION INTRAMUSCULAR; INTRAVENOUS EVERY 5 MIN PRN
Status: DISCONTINUED | OUTPATIENT
Start: 2025-02-13 | End: 2025-02-13 | Stop reason: HOSPADM

## 2025-02-13 RX ORDER — FENTANYL CITRATE 50 UG/ML
INJECTION, SOLUTION INTRAMUSCULAR; INTRAVENOUS PRN
Status: DISCONTINUED | OUTPATIENT
Start: 2025-02-13 | End: 2025-02-13

## 2025-02-13 RX ORDER — ONDANSETRON 2 MG/ML
4 INJECTION INTRAMUSCULAR; INTRAVENOUS EVERY 30 MIN PRN
Status: DISCONTINUED | OUTPATIENT
Start: 2025-02-13 | End: 2025-02-13 | Stop reason: HOSPADM

## 2025-02-13 RX ORDER — HEPARIN SODIUM 10000 [USP'U]/100ML
INJECTION, SOLUTION INTRAVENOUS CONTINUOUS PRN
Status: DISCONTINUED | OUTPATIENT
Start: 2025-02-13 | End: 2025-02-13 | Stop reason: HOSPADM

## 2025-02-13 RX ORDER — ONDANSETRON 4 MG/1
4 TABLET, ORALLY DISINTEGRATING ORAL EVERY 30 MIN PRN
Status: DISCONTINUED | OUTPATIENT
Start: 2025-02-13 | End: 2025-02-13 | Stop reason: HOSPADM

## 2025-02-13 RX ORDER — GLYCOPYRROLATE 0.2 MG/ML
INJECTION, SOLUTION INTRAMUSCULAR; INTRAVENOUS PRN
Status: DISCONTINUED | OUTPATIENT
Start: 2025-02-13 | End: 2025-02-13

## 2025-02-13 RX ORDER — HYDROMORPHONE HCL IN WATER/PF 6 MG/30 ML
0.4 PATIENT CONTROLLED ANALGESIA SYRINGE INTRAVENOUS EVERY 5 MIN PRN
Status: DISCONTINUED | OUTPATIENT
Start: 2025-02-13 | End: 2025-02-13 | Stop reason: HOSPADM

## 2025-02-13 RX ORDER — ONDANSETRON 4 MG/1
4 TABLET, ORALLY DISINTEGRATING ORAL EVERY 6 HOURS PRN
Status: DISCONTINUED | OUTPATIENT
Start: 2025-02-13 | End: 2025-02-13 | Stop reason: HOSPADM

## 2025-02-13 RX ORDER — LIDOCAINE 40 MG/G
CREAM TOPICAL
Status: DISCONTINUED | OUTPATIENT
Start: 2025-02-13 | End: 2025-02-13 | Stop reason: HOSPADM

## 2025-02-13 RX ORDER — SODIUM CHLORIDE 9 MG/ML
100 INJECTION, SOLUTION INTRAVENOUS CONTINUOUS
Status: DISCONTINUED | OUTPATIENT
Start: 2025-02-13 | End: 2025-02-13 | Stop reason: HOSPADM

## 2025-02-13 RX ORDER — PROTAMINE SULFATE 10 MG/ML
INJECTION, SOLUTION INTRAVENOUS PRN
Status: DISCONTINUED | OUTPATIENT
Start: 2025-02-13 | End: 2025-02-13

## 2025-02-13 RX ORDER — FENTANYL CITRATE 50 UG/ML
25 INJECTION, SOLUTION INTRAMUSCULAR; INTRAVENOUS
Status: DISCONTINUED | OUTPATIENT
Start: 2025-02-13 | End: 2025-02-13 | Stop reason: HOSPADM

## 2025-02-13 RX ORDER — ALBUTEROL SULFATE 90 UG/1
2 INHALANT RESPIRATORY (INHALATION) EVERY 4 HOURS PRN
Status: DISCONTINUED | OUTPATIENT
Start: 2025-02-13 | End: 2025-02-13 | Stop reason: HOSPADM

## 2025-02-13 RX ORDER — ACETAMINOPHEN 325 MG/1
650 TABLET ORAL EVERY 4 HOURS PRN
Status: DISCONTINUED | OUTPATIENT
Start: 2025-02-13 | End: 2025-02-13 | Stop reason: HOSPADM

## 2025-02-13 RX ORDER — OXYCODONE HYDROCHLORIDE 5 MG/1
10 TABLET ORAL
Status: COMPLETED | OUTPATIENT
Start: 2025-02-13 | End: 2025-02-13

## 2025-02-13 RX ORDER — LIDOCAINE HYDROCHLORIDE AND EPINEPHRINE 10; 10 MG/ML; UG/ML
INJECTION, SOLUTION INFILTRATION; PERINEURAL
Status: DISCONTINUED | OUTPATIENT
Start: 2025-02-13 | End: 2025-02-13 | Stop reason: HOSPADM

## 2025-02-13 RX ORDER — OXYCODONE HYDROCHLORIDE 5 MG/1
5 TABLET ORAL
Status: DISCONTINUED | OUTPATIENT
Start: 2025-02-13 | End: 2025-02-13 | Stop reason: HOSPADM

## 2025-02-13 RX ORDER — ONDANSETRON 2 MG/ML
INJECTION INTRAMUSCULAR; INTRAVENOUS PRN
Status: DISCONTINUED | OUTPATIENT
Start: 2025-02-13 | End: 2025-02-13

## 2025-02-13 RX ORDER — HEPARIN SODIUM 1000 [USP'U]/ML
INJECTION, SOLUTION INTRAVENOUS; SUBCUTANEOUS
Status: DISCONTINUED | OUTPATIENT
Start: 2025-02-13 | End: 2025-02-13 | Stop reason: HOSPADM

## 2025-02-13 RX ORDER — SODIUM CHLORIDE 9 MG/ML
INJECTION, SOLUTION INTRAVENOUS CONTINUOUS PRN
Status: DISCONTINUED | OUTPATIENT
Start: 2025-02-13 | End: 2025-02-13

## 2025-02-13 RX ORDER — KETOROLAC TROMETHAMINE 15 MG/ML
15 INJECTION, SOLUTION INTRAMUSCULAR; INTRAVENOUS ONCE
Status: COMPLETED | OUTPATIENT
Start: 2025-02-13 | End: 2025-02-13

## 2025-02-13 RX ORDER — DEXAMETHASONE SODIUM PHOSPHATE 4 MG/ML
4 INJECTION, SOLUTION INTRA-ARTICULAR; INTRALESIONAL; INTRAMUSCULAR; INTRAVENOUS; SOFT TISSUE
Status: DISCONTINUED | OUTPATIENT
Start: 2025-02-13 | End: 2025-02-13 | Stop reason: HOSPADM

## 2025-02-13 RX ORDER — FLUTICASONE PROPIONATE 50 MCG
1 SPRAY, SUSPENSION (ML) NASAL DAILY
COMMUNITY

## 2025-02-13 RX ORDER — HYDROMORPHONE HCL IN WATER/PF 6 MG/30 ML
0.2 PATIENT CONTROLLED ANALGESIA SYRINGE INTRAVENOUS EVERY 5 MIN PRN
Status: DISCONTINUED | OUTPATIENT
Start: 2025-02-13 | End: 2025-02-13 | Stop reason: HOSPADM

## 2025-02-13 RX ORDER — IBUPROFEN 600 MG/1
600 TABLET, FILM COATED ORAL EVERY 6 HOURS PRN
Status: DISCONTINUED | OUTPATIENT
Start: 2025-02-13 | End: 2025-02-13 | Stop reason: HOSPADM

## 2025-02-13 RX ORDER — FENTANYL CITRATE 50 UG/ML
50 INJECTION, SOLUTION INTRAMUSCULAR; INTRAVENOUS EVERY 5 MIN PRN
Status: DISCONTINUED | OUTPATIENT
Start: 2025-02-13 | End: 2025-02-13 | Stop reason: HOSPADM

## 2025-02-13 RX ADMIN — SODIUM CHLORIDE: 9 INJECTION, SOLUTION INTRAVENOUS at 09:21

## 2025-02-13 RX ADMIN — GLYCOPYRROLATE 0.3 MG: 0.2 INJECTION INTRAMUSCULAR; INTRAVENOUS at 09:50

## 2025-02-13 RX ADMIN — OXYCODONE HYDROCHLORIDE 10 MG: 5 TABLET ORAL at 12:23

## 2025-02-13 RX ADMIN — METOPROLOL TARTRATE 2.5 MG: 5 INJECTION INTRAVENOUS at 11:27

## 2025-02-13 RX ADMIN — ROCURONIUM 20 MG: 50 INJECTION, SOLUTION INTRAVENOUS at 10:11

## 2025-02-13 RX ADMIN — ROCURONIUM 20 MG: 50 INJECTION, SOLUTION INTRAVENOUS at 11:02

## 2025-02-13 RX ADMIN — FENTANYL CITRATE 100 MCG: 50 INJECTION, SOLUTION INTRAMUSCULAR; INTRAVENOUS at 09:33

## 2025-02-13 RX ADMIN — SODIUM CHLORIDE: 9 INJECTION, SOLUTION INTRAVENOUS at 10:53

## 2025-02-13 RX ADMIN — METOPROLOL TARTRATE 2.5 MG: 5 INJECTION INTRAVENOUS at 11:30

## 2025-02-13 RX ADMIN — ROCURONIUM 20 MG: 50 INJECTION, SOLUTION INTRAVENOUS at 10:32

## 2025-02-13 RX ADMIN — SUGAMMADEX 300 MG: 100 INJECTION, SOLUTION INTRAVENOUS at 11:21

## 2025-02-13 RX ADMIN — ROCURONIUM 50 MG: 50 INJECTION, SOLUTION INTRAVENOUS at 09:33

## 2025-02-13 RX ADMIN — KETOROLAC TROMETHAMINE 15 MG: 15 INJECTION, SOLUTION INTRAMUSCULAR; INTRAVENOUS at 11:55

## 2025-02-13 RX ADMIN — ALBUTEROL SULFATE 2 PUFF: 90 AEROSOL, METERED RESPIRATORY (INHALATION) at 12:17

## 2025-02-13 RX ADMIN — PROPOFOL 200 MG: 10 INJECTION, EMULSION INTRAVENOUS at 09:33

## 2025-02-13 RX ADMIN — ONDANSETRON 4 MG: 2 INJECTION INTRAMUSCULAR; INTRAVENOUS at 11:18

## 2025-02-13 RX ADMIN — PROTAMINE SULFATE 60 MG: 10 INJECTION, SOLUTION INTRAVENOUS at 11:18

## 2025-02-13 ASSESSMENT — ACTIVITIES OF DAILY LIVING (ADL)
ADLS_ACUITY_SCORE: 41

## 2025-02-13 ASSESSMENT — ENCOUNTER SYMPTOMS: DYSRHYTHMIAS: 1

## 2025-02-13 NOTE — ANESTHESIA POSTPROCEDURE EVALUATION
Patient: Isabelle Gaxiola    Procedure: Procedure(s):  Ablation Atrial Fibrillation       Anesthesia Type:  General    Note:  Disposition: Outpatient   Postop Pain Control: Uneventful            Sign Out: Well controlled pain   PONV:    Neuro/Psych: Uneventful            Sign Out: Acceptable/Baseline neuro status   Airway/Respiratory: Uneventful            Sign Out: Acceptable/Baseline resp. status   CV/Hemodynamics: Uneventful            Sign Out: Acceptable CV status; No obvious hypovolemia; No obvious fluid overload   Other NRE: NONE   DID A NON-ROUTINE EVENT OCCUR?            Last vitals:  Vitals Value Taken Time   /69 02/13/25 1245   Temp     Pulse 78 02/13/25 1252   Resp 7 02/13/25 1252   SpO2 98 % 02/13/25 1252   Vitals shown include unfiled device data.    Electronically Signed By: Anh Manjarrez MD  February 13, 2025  12:54 PM

## 2025-02-13 NOTE — INTERVAL H&P NOTE
"I have reviewed the surgical (or preoperative) H&P that is linked to this encounter, and examined the patient. There are no significant changes    Today's labs pending  Labs on 2/5 were all stable  No missed doses of Eliquis    Clinical Conditions Present on Arrival:  Clinically Significant Risk Factors Present on Admission     # Drug Induced Coagulation Defect: home medication list includes an anticoagulant medication       # Severe Obesity: Estimated body mass index is 52.62 kg/m  as calculated from the following:    Height as of this encounter: 1.676 m (5' 6\").    Weight as of this encounter: 147.9 kg (326 lb).           "

## 2025-02-13 NOTE — ANESTHESIA PREPROCEDURE EVALUATION
"Anesthesia Pre-Procedure Evaluation    Patient: Isabelle Gaxiola   MRN: [id number] : 1970        Procedure : Procedure(s):  Ablation Atrial Fibrillation          No past medical history on file.   No past surgical history on file.   Allergies   Allergen Reactions    Prednisone Other (See Comments)     \"Makes me angry\"    Keflex [Cephalexin] Rash      Social History     Tobacco Use    Smoking status: Former     Types: Cigarettes    Smokeless tobacco: Never   Substance Use Topics    Alcohol use: Not on file      Wt Readings from Last 1 Encounters:   25 (!) 147.9 kg (326 lb)        Anesthesia Evaluation        History of anesthetic complications       ROS/MED HX  ENT/Pulmonary:     (+) sleep apnea,                                       Neurologic:       Cardiovascular:     (+)  hypertension- -   -  - -                        dysrhythmias, a-fib,             METS/Exercise Tolerance:     Hematologic:       Musculoskeletal:       GI/Hepatic:       Renal/Genitourinary:       Endo:       Psychiatric/Substance Use:       Infectious Disease:       Malignancy:       Other:            Physical Exam    Airway        Mallampati: II   TM distance: > 3 FB   Neck ROM: full     Respiratory Devices and Support         Dental       (+) Completely normal teeth      Cardiovascular          Rhythm and rate: irregular and normal     Pulmonary           breath sounds clear to auscultation           OUTSIDE LABS:  CBC:   Lab Results   Component Value Date    WBC 5.2 2025    WBC 9.1 2025    HGB 12.5 2025    HGB 14.3 2025    HCT 40.2 2025    HCT 47.1 (H) 2025     2025     2025     BMP:   Lab Results   Component Value Date     2025     2025    POTASSIUM 3.8 2025    POTASSIUM 4.4 2025    CHLORIDE 108 (H) 2025    CHLORIDE 106 2025    CO2 28 2025    CO2 23 2025    BUN 15.9 2025    BUN 23.6 (H) 2025    " "CR 0.66 02/13/2025    CR 0.67 02/05/2025     (H) 02/13/2025     (H) 02/05/2025     COAGS: No results found for: \"PTT\", \"INR\", \"FIBR\"  POC: No results found for: \"BGM\", \"HCG\", \"HCGS\"  HEPATIC:   Lab Results   Component Value Date    ALBUMIN 4.0 01/08/2025    PROTTOTAL 7.1 01/08/2025    ALT 17 01/08/2025    AST 18 01/08/2025    ALKPHOS 87 01/08/2025    BILITOTAL 0.2 01/08/2025     OTHER:   Lab Results   Component Value Date    VIOLETA 9.4 02/13/2025    MAG 2.0 01/12/2025    TSH 4.92 (H) 01/08/2025    T4 1.08 01/08/2025       Anesthesia Plan    ASA Status:  3    NPO Status:  NPO Appropriate    Anesthesia Type: General.     - Airway: ETT   Induction: Intravenous.   Maintenance: Inhalation.        Consents    Anesthesia Plan(s) and associated risks, benefits, and realistic alternatives discussed. Questions answered and patient/representative(s) expressed understanding.     - Discussed: Risks, Benefits and Alternatives for BOTH SEDATION and the PROCEDURE were discussed     - Discussed with:  Patient            Postoperative Care    Pain management: Multi-modal analgesia.   PONV prophylaxis: Ondansetron (or other 5HT-3)     Comments:               Anh Manjarrez MD    I have reviewed the pertinent notes and labs in the chart from the past 30 days and (re)examined the patient.  Any updates or changes from those notes are reflected in this note.    Clinically Significant Risk Factors Present on Admission          # Hyperchloremia: Highest Cl = 108 mmol/L in last 2 days, will monitor as appropriate           # Drug Induced Coagulation Defect: home medication list includes an anticoagulant medication    # Hypertension: Noted on problem list           # Severe Obesity: Estimated body mass index is 52.62 kg/m  as calculated from the following:    Height as of this encounter: 1.676 m (5' 6\").    Weight as of this encounter: 147.9 kg (326 lb).                "

## 2025-02-13 NOTE — DISCHARGE INSTRUCTIONS
[facility]  Cardiac Electrophysiology  [address]  [place] [address]   Office: [phone]  Fax: [phone]     Cardiac Electrophysiology - Post Ablation Discharge Instructions      PROCEDURE   Atrial fibrillation ablation         MEDICATION INSTRUCTIONS   Continue taking all home meds  Continue taking your blood thinner .          DISCHARGE INSTRUCTIONS   General instructions  Have an adult stay with you until tomorrow.  You may resume your normal diet.    You may shower tomorrow.  Do NOT take a bath, or use a hot tub or pool for at least 1 week. Do not scrub the site. Do not use lotion or powder near the puncture site.    Groin care instructions  For the first 24 hrs - check the puncture site every 1-2 hours while awake.  You may keep a bandaid over the puncture sites for 1 or 2 days post-procedure and thereafter may keep these sites uncovered.  Change the bandaid daily.  If there is minor oozing, apply another bandaid and remove it after 12 hours.  For 2 days, when you cough, sneeze, laugh or move your bowels, hold your hand over the puncture site and press firmly.  Mild bruising at the access sites is normal.  If you notice increased swelling, external bleeding, or have other concerns regarding your access sites please consider emergency department evaluation and call your electrophysiology team's office    Activity recommendations  Do not drive for 3 days.  Avoid stooping or squatting more than 90 degrees at the hips for 7 days  Avoid repetitive motions such as loading , vacuuming, raking or shoveling  Avoid heavy lifting (greater than 25lbs) for 1 week    Post ablation instructions  You may have some irregular heartbeats following your ablation.  These may feel very strong and feel like atrial fibrillation re-initiation is imminent - these episodes should occur less frequently over time.  Recurrent atrial fibrillation can occur within the first 3 months  post ablation while your heart recovers from the procedure.  Pleuritic chest discomfort (chest pain worse with taking deep breaths, worse with laying flat on your back) can occur after ablation, usually coming about within the first 24-48hrs post ablation.  If this occurs and is severe enough to be troublesome to you, please call us and consider starting a course of ibuprofen 400mg three times daily for 5 to 7 days    Things to watch for  As with any type of procedure, please be more attentive to unusual symptoms post ablation (eg. fever, neurologic changes, pain with swallowing, loss of consciousness, etc) - we recommend ER evaluation for any such symptoms in the first few weeks post procedure.    Consider ER evaluation for the following:  Severe chest pain not relieved by Tylenol or Ibuprofen  You have chills or a fever greater than 101 F (38 C)  Neurologic changes (eg. leg, arm or face weakness or numbness, difficulties with speech or word finding, problems walking or with your balance, vision changes)  Severe difficulty swallowing and/or you are coughing up blood  Shortness of breath  Increased groin pain or a large or growing hard lump around the site  Groin is red, swollen, hot or tender  Blood or fluid is draining from the groin site  Any numbness, coolness or changes in color in your extremities  Groin pain not relieved by Tylenol or Advil  Recurrent atrial fibrillation associated with sustained rapid heart rates or associated with additional concerning symptoms.    Our office will have a follow-up visit scheduled for you in approximately 6 weeks.  Please do not hesitate to call us before that time should issues arise.        [facility]    [phone]    If you are calling after hours, please listen to the entire voicemail,   a live  will answer at the end of the message.    [phone] to reach the EP nurses working with Dr Shah

## 2025-02-13 NOTE — Clinical Note
[facility] Med system 12 lead EKG, hemodynamics 5 lead, pulse oximetery, NIBP, [employer] hands off defibrillator/external pacer, with 3 monitoring leads to patient. Baseline assessment done.

## 2025-02-13 NOTE — PROGRESS NOTES
Bedrest completed and pt dangled at bedside with stopcock on then stood at bedside. Hematoma developed at Right groin site. Manual pressure applied for 15 minutes. Stopcock remained on and site softened. Area marked. Pt instructed on bedrest.

## 2025-02-13 NOTE — ANESTHESIA CARE TRANSFER NOTE
Patient: Isabelle Gaxiola    Procedure: Procedure(s):  Ablation Atrial Fibrillation       Diagnosis: Atrial fibrillation  Diagnosis Additional Information: No value filed.    Anesthesia Type:   General     Note:    Oropharynx: oropharynx clear of all foreign objects and spontaneously breathing  Level of Consciousness: awake  Oxygen Supplementation: face mask  Level of Supplemental Oxygen (L/min / FiO2): 8  Independent Airway: airway patency satisfactory and stable  Dentition: dentition unchanged  Vital Signs Stable: post-procedure vital signs reviewed and stable  Report to RN Given: handoff report given  Patient transferred to: Cardiac Special Care      Vitals:  Vitals Value Taken Time   BP     Temp     Pulse     Resp     SpO2         Electronically Signed By: LUIS Swenson CRNA  February 13, 2025  11:39 AM

## 2025-02-13 NOTE — ANESTHESIA PROCEDURE NOTES
Airway       Patient location during procedure: OR       Procedure Start/Stop Times: 2/13/2025 9:35 AM  Staff -        CRNA: Macho Bailon APRN CRNA       Performed By: CRNA  Consent for Airway        Urgency: elective  Indications and Patient Condition       Indications for airway management: rangel-procedural       Induction type:intravenous       Mask difficulty assessment: 1 - vent by mask    Final Airway Details       Final airway type: endotracheal airway       Successful airway: ETT - single  Endotracheal Airway Details        ETT size (mm): 7.0       Cuffed: yes       Successful intubation technique: video laryngoscopy       VL Blade Size: Glidescope 3       Grade View of Cords: 1       Adjucts: stylet       Position: Right       Measured from: lips       Secured at (cm): 22       Bite block used: None    Post intubation assessment        Placement verified by: capnometry, equal breath sounds and chest rise        Number of attempts at approach: 1       Number of other approaches attempted: 0       Secured with: silk tape       Ease of procedure: easy       Dentition: Intact    Medication(s) Administered   Medication Administration Time: 2/13/2025 9:35 AM

## 2025-02-17 ENCOUNTER — VIRTUAL VISIT (OUTPATIENT)
Dept: CARDIOLOGY | Facility: CLINIC | Age: 55
End: 2025-02-17
Payer: COMMERCIAL

## 2025-02-17 DIAGNOSIS — Z98.890 STATUS POST ABLATION OF ATRIAL FIBRILLATION: ICD-10-CM

## 2025-02-17 DIAGNOSIS — I48.0 PAROXYSMAL A-FIB (H): Primary | ICD-10-CM

## 2025-02-17 DIAGNOSIS — Z86.79 STATUS POST ABLATION OF ATRIAL FIBRILLATION: ICD-10-CM

## 2025-02-17 PROCEDURE — 99207 PR NO CHARGE NURSE ONLY: CPT | Mod: 93

## 2025-02-17 NOTE — PROGRESS NOTES
Pt is s/p PVI PO day 4, today 2/17/2025 , PVI was completed on 2-14-25 with Dr. Shah and pt was discharged the same day.  PC was placed to pt, spoke to pt    General Assessment:     Weight: Pt reports pt is unable to report weight, but denies s/s of fluid retention    Vital signs:  Pt reports normal heart rates and blood pressure and Pt has been afebrile.    Pain: Pt denies generalized or localized pain abnormal to healing s/p     /GI: Pt denies difficulty swallowing, denies heart burn, denies constipation, denies urinary retention/difficulty, reports normal appetite, and reports staying hydrated.    Respiratory: Pt denies SOB and denies any further symptoms abnormal to normal healing process s/p PVI.    Activity: Pt is tolerating advancement in activity while following physical restrictions.     Neurological:  Pt denies vision changes, changes in speech, changes in balance, and changes in strength or motor function.    Rhythm Assessment:   Pt notes a significant amount of palpitations and heart racing.  She notes it is a little bit better today.  She denies any sensations of atrial fibrillation.  She will call later in the week if she continues to have frequent ectopy.    Procedure Site Assessment:   Pt reports some bruising around sites without significant change from hospital discharge and normal to PVI recovery    Anticoagulation/Medication:  Pt remain on Eliquis without interruption  Pt  confirms continuing Metoprolol as directed.     Education completed with pt at this visit:  Reviewed normal post-op PVI healing process, when to contact EP-RN/EP-MD, contact information was given to the pt for further concerns or questions, after hours contact was reviewed with patient, and pt verbalized understanding    Follow up:  AVS mailed to patient and is available through my chart.  Pt Does have a 6 week follow up scheduled with Nitesh Ocampo on 4-2-25 .    2/17/2025 11:07 AM  Naomi Martinez RN

## 2025-02-17 NOTE — PATIENT INSTRUCTIONS
Instructions Following your Ablation Procedure    Your anticoagulation medication Eliquis:  It is important to remain on your anticoagulation medication uninterrupted after your ablation to reduce your risk of a stroke or heart attack, do not stop this medication  Please contact me if you have any questions regarding your anticoagulation medication    Groin care instructions  Keep the site clean and dry, do not place a bandage over the site. If there is minor oozing, apply another bandaid and remove it after 12 hours.  Mild bruising at the access sites is normal. If you notice increased swelling, external bleeding, or have other concerns regarding your access sites please consider emergency department evaluation for significant changes and call your electrophysiology team's office.  You may experience mild discomfort at your groin sites, applying ice packs 20min 3-4 times a day can help alleviate this discomfort.    Activity recommendations  You can resume driving.  Avoid stooping or squatting more than 90 degrees at the hips, repetitive motions such as loading , vacuuming, raking or shoveling, and heavy lifting (greater than 25lbs) for 1 week  Increase your activity gradually over the next 5-10 days, working back to your normal daily activity/routine.    Post ablation instructions  Stay well hydrated, and increase your fluid intake during this recovery period  High protein foods aide in your bodies healing process  You may have some irregular heartbeats and/or atrial fibrillation following your ablation which is normal to recovery, these episodes should occur less frequently over time.   Recurrent atrial fibrillation can occur within the first 3 months post ablation while your heart recovers from the procedure. Please call the electrophysiology team's office if you have an episode lasting greater than 4 hours, or if you notice the episodes are increasing in frequency or duration  Pleuritic chest  discomfort (chest pain worse with taking deep breaths, worse with laying flat on your back) can occur after ablation, usually coming about within the first 24-48hrs post ablation. If this occurs and is severe enough to be troublesome to you, please call us and consider starting a course of ibuprofen 400mg three times daily for 5 to 7 days    Things to watch for  As with any type of procedure, please be more attentive to unusual symptoms post ablation (eg. fever, neurologic changes, pain with swallowing, loss of consciousness, etc) - we recommend ER evaluation for any such symptoms in the first few weeks post procedure.    Consider ER evaluation for the following:  Severe chest pain not relieved by Tylenol or Ibuprofen  You have chills or a fever greater than 101 F (38 C)  Neurologic changes (eg. leg, arm or face weakness or numbness, difficulties with speech or word finding, problems  walking or with your balance, vision changes)  Severe difficulty swallowing and/or you are coughing up blood  Shortness of breath  Increased groin pain or a large or growing hard lump around the site  Groin is red, swollen, hot or tender  Blood or fluid is draining from the groin site  Any numbness, coolness or changes in color in your extremities  Groin pain not relieved by Tylenol or Advil  Recurrent atrial fibrillation associated with sustained rapid heart rates or associated with additional concerning  symptoms.    Your follow up appointments are as follows:  You will be seen by the electrophysiologist nurse practitioner at 6 weeks after your ablation  At your 6 week appointment, a 3 month follow-up appointment will be arranged with either the Nurse Practitioner or the Electrophysiology provider     Sincerely,  Naomi Martinez RN [phone]    After hours please contact the on call service at # [phone]

## 2025-02-19 ENCOUNTER — HOSPITAL ENCOUNTER (EMERGENCY)
Facility: HOSPITAL | Age: 55
Discharge: HOME | End: 2025-02-19
Attending: EMERGENCY MEDICINE | Admitting: EMERGENCY MEDICINE
Payer: COMMERCIAL

## 2025-02-19 ENCOUNTER — TELEPHONE (OUTPATIENT)
Dept: CARDIOLOGY | Facility: CLINIC | Age: 55
End: 2025-02-19
Payer: COMMERCIAL

## 2025-02-19 VITALS
OXYGEN SATURATION: 97 % | BODY MASS INDEX: 47.09 KG/M2 | HEIGHT: 66 IN | SYSTOLIC BLOOD PRESSURE: 150 MMHG | DIASTOLIC BLOOD PRESSURE: 95 MMHG | TEMPERATURE: 100 F | HEART RATE: 79 BPM | RESPIRATION RATE: 18 BRPM | WEIGHT: 293 LBS

## 2025-02-19 DIAGNOSIS — S30.1XXA GROIN HEMATOMA, INITIAL ENCOUNTER: ICD-10-CM

## 2025-02-19 LAB
ERYTHROCYTE [DISTWIDTH] IN BLOOD BY AUTOMATED COUNT: 20.5 % (ref 10–15)
FLUAV RNA SPEC QL NAA+PROBE: NEGATIVE
FLUBV RNA RESP QL NAA+PROBE: NEGATIVE
HCT VFR BLD AUTO: 38.5 % (ref 35–47)
HGB BLD-MCNC: 12.1 G/DL (ref 11.7–15.7)
MCH RBC QN AUTO: 24.2 PG (ref 26.5–33)
MCHC RBC AUTO-ENTMCNC: 31.4 G/DL (ref 31.5–36.5)
MCV RBC AUTO: 77 FL (ref 78–100)
PLATELET # BLD AUTO: 257 10E3/UL (ref 150–450)
RBC # BLD AUTO: 5 10E6/UL (ref 3.8–5.2)
RSV RNA SPEC NAA+PROBE: NEGATIVE
SARS-COV-2 RNA RESP QL NAA+PROBE: NEGATIVE
WBC # BLD AUTO: 7.3 10E3/UL (ref 4–11)

## 2025-02-19 PROCEDURE — 99284 EMERGENCY DEPT VISIT MOD MDM: CPT | Mod: 25

## 2025-02-19 PROCEDURE — 85041 AUTOMATED RBC COUNT: CPT | Performed by: EMERGENCY MEDICINE

## 2025-02-19 PROCEDURE — 36415 COLL VENOUS BLD VENIPUNCTURE: CPT | Performed by: EMERGENCY MEDICINE

## 2025-02-19 PROCEDURE — 87637 SARSCOV2&INF A&B&RSV AMP PRB: CPT | Performed by: EMERGENCY MEDICINE

## 2025-02-19 NOTE — ED TRIAGE NOTES
Pt coming in withy right groin pain   after a cardiac ablation on Thursday.  Pt states has 2 lumps on her groin and the bigger one is bothering her. Pt call nurse line and told to come to ED.  Pt denies cp and once in a while some sob.

## 2025-02-19 NOTE — DISCHARGE INSTRUCTIONS
Recommend hold on your anticoagulation for the next 48 to 72 hours.  Please ice your groin intermittently to help with the inflammation.  Recommend recheck of your hemoglobin through your primary care doctor in 2 days as well as repeat examination.  Please monitor for the development of a fever.  If you develop persistent fevers return to the emergency department repeat assessment

## 2025-02-19 NOTE — ED PROVIDER NOTES
EMERGENCY DEPARTMENT ENCOUNTER      NAME: Isabelle Gaxiola  AGE: 54 year old female  YOB: 1970  MRN: [id number]  EVALUATION DATE & TIME: No admission date for patient encounter.    PCP: Sowmya Hanna    ED PROVIDER: Anish Bell MD      Chief Complaint   Patient presents with    Groin Pain     FINAL IMPRESSION:  1. Groin hematoma, initial encounter        ED COURSE & MEDICAL DECISION MAKING:    Pertinent Labs & Imaging studies reviewed. (See chart for details)  54 year old female presents to the Emergency Department for evaluation of right groin pain and bruising in the context of cardiac ablation procedure with vascular access on 213.  She reports that he had some difficulty stopping the bleeding after the procedure she developed pretty extensive bruising to the area but overall was feeling okay then noted some palpable lumps reached out to the clinic and was referred to the emergency department for assessment.  I evaluated the patient in the waiting room due to the capacity crisis in the hospital.  She had extensive old bruising to the right groin distal neurovascular was intact.  Recommended CBC and ultrasound to assess for hematoma versus pseudoaneurysm.  She was noted to have a temperature of 100.0 but does not relate any symptoms to suggest infection.  There is no redness to the groin.  She has no fever no chills no general malaise.  I did order viral studies for assessment will await ultrasound    Studies negative.  Ultrasound demonstrating large hematoma.  I think this is resolving from the initial procedure no pseudoaneurysm evident.  With stable hemoglobin and patient symptoms controlled I think she is okay for discharge and close outpatient follow-up.  She is ambulatory without any difficulty her pain is well-controlled no signs of other infection at this time I recommended that she closely monitor her temperatures if she has persistently elevated temperature we may need to pursue  additional workup but at the short-term I think discharge home hold on anticoagulation follow-up with primary care doctor in 2 days for recheck of hemoglobin and rediscussion regarding her medications and discussed those return precautions treatment plan and  follow-up plan prior to discharge       At the conclusion of the encounter I discussed the results of all of the tests and the disposition. The questions were answered. The patient or family acknowledged understanding and was agreeable with the care plan.     Medical Decision Making  Obtained supplemental history:Supplemental history obtained?: No  Reviewed external records: External records reviewed?: Documented in chart and Outpatient Record: Reviewed cardiac ablation procedure on 217  Care impacted by chronic illness:Anticoagulated State  Did you consider but not order tests?: Work up considered but not performed and documented in chart, if applicable  Did you interpret images independently?: Independent interpretation of ECG and images noted in documentation, when applicable.  Consultation discussion with other provider:Did you involve another provider (consultant, MH, pharmacy, etc.)?: No  Discharge. No recommendations on prescription strength medication(s). See documentation for any additional details.    MIPS (CTPE, Dental pain, Rizvi, Sinusitis, Asthma/COPD, Head Trauma): Not Applicable      MEDICATIONS GIVEN IN THE EMERGENCY:  Medications - No data to display    NEW PRESCRIPTIONS STARTED AT TODAY'S ER VISIT  New Prescriptions    No medications on file          =================================================================    HPI    Patient information was obtained from: Patient  Isabelle Gaxiola is a 54 year old female with a pertinent history of chronic anticoagulation status post ablation procedure on 217 presents with increasing groin pain and palpable lumps in the area of vascular access.  Rechecked clinic and was referred to the emergency  "department for assessment.  Patient has continued on anticoagulation.  She reported difficulty controlling the bleeding post procedure but ultimately did get it to stop.  Over the next couple of days she developed extensive bruising to the soft tissues of her groin and proximal anterior thigh.  Noted more recently the development of a lump and increased pain to the area so reached out to the clinic and subsequently was referred to the emergency department for assessment      PAST MEDICAL HISTORY:  No past medical history on file.    PAST SURGICAL HISTORY:  Past Surgical History:   Procedure Laterality Date    EP ABLATION PULMONARY VEIN ISOLATION N/A 2/13/2025    Procedure: Ablation Atrial Fibrillation;  Surgeon: Juliano Shah MD;  Location: [facility]           CURRENT MEDICATIONS:    albuterol (PROAIR HFA/PROVENTIL HFA/VENTOLIN HFA) 108 (90 Base) MCG/ACT inhaler  apixaban ANTICOAGULANT (ELIQUIS) 5 MG tablet  cholecalciferol 50 MCG (2000 UT) CAPS  ferrous gluconate (FERGON) 324 (38 Fe) MG tablet  fluticasone (FLONASE) 50 MCG/ACT nasal spray  Methylcobalamin 1000 MCG SUBL  metoprolol succinate ER (TOPROL XL) 50 MG 24 hr tablet        ALLERGIES:  Allergies   Allergen Reactions    Prednisone Other (See Comments)     \"Makes me angry\"    Keflex [Cephalexin] Rash       FAMILY HISTORY:  No family history on file.    SOCIAL HISTORY:   Social History     Socioeconomic History    Marital status: Single   Tobacco Use    Smoking status: Former     Types: Cigarettes    Smokeless tobacco: Never       VITALS:  BP (!) 198/104   Pulse 72   Temp 100  F (37.8  C) (Oral)   Resp 16   Ht 1.676 m (5' 6\")   Wt (!) 147.9 kg (326 lb)   SpO2 98%   BMI 52.62 kg/m      PHYSICAL EXAM    VITAL SIGNS: BP (!) 198/104   Pulse 72   Temp 100  F (37.8  C) (Oral)   Resp 16   Ht 1.676 m (5' 6\")   Wt (!) 147.9 kg (326 lb)   SpO2 98%   BMI 52.62 kg/m    Constitutional:  Well developed, well nourished, NAD  EYES: Conjunctivae " clear, no discharge  HENT: Atraumatic, normocephalic, bilateral external ears normal.  Oropharynx moist. Nose normal.   Neck: Normal ROM , Supple   Respiratory:  No respiratory distress, normal nonlabored respirations.   Cardiovascular:  Distal perfusion appears intact  Musculoskeletal:  No edema appreciated, Good range of motion in all major joints.   Integument: Extensive old bruising noted to the right groin there is an area of discomfort palpable at her vascular access site no evidence of active bleeding or cellulitis by clinical examination.  Neurologic:  Alert and oriented. No focal deficits noted.  Ambulatory  Psychiatric:  Affect normal, Judgment normal, Mood normal.    LAB:  All pertinent labs reviewed and interpreted.  Results for orders placed or performed during the hospital encounter of 02/19/25   Us Post Vascular Access Low Ext Duplex    Impression    IMPRESSION:  1.  Large right groin hematoma. No evidence of pseudoaneurysm.     CBC (+ platelets, no diff)   Result Value Ref Range    WBC Count 7.3 4.0 - 11.0 10e3/uL    RBC Count 5.00 3.80 - 5.20 10e6/uL    Hemoglobin 12.1 11.7 - 15.7 g/dL    Hematocrit 38.5 35.0 - 47.0 %    MCV 77 (L) 78 - 100 fL    MCH 24.2 (L) 26.5 - 33.0 pg    MCHC 31.4 (L) 31.5 - 36.5 g/dL    RDW 20.5 (H) 10.0 - 15.0 %    Platelet Count 257 150 - 450 10e3/uL   Influenza A/B, RSV and SARS-CoV2 PCR (COVID-19) Nose    Specimen: Nose; Swab   Result Value Ref Range    Influenza A PCR Negative Negative    Influenza B PCR Negative Negative    RSV PCR Negative Negative    SARS CoV2 PCR Negative Negative       RADIOLOGY:  Reviewed all pertinent imaging. Please see official radiology report.  Us Post Vascular Access Low Ext Duplex   Final Result   IMPRESSION:   1.  Large right groin hematoma. No evidence of pseudoaneurysm.           ing in a scribe capacity, has observed my performance of the services and has documented them in accordance with my direction.    Anish Bell MD  [facility]  [facility] EMERGENCY DEPARTMENT  [facility] [facility] [id number]  [phone]       Anish Bell MD  02/19/25 1528

## 2025-02-19 NOTE — TELEPHONE ENCOUNTER
Pt called today to report starting last night she developed a hand sized swollen area under her right groin incision site that is warm and painful to push on and when she moves around. Directly on her incision site in the right groin she also has a golf ball hard lump which is not painful.     S/P PVI on 2/13/25.     Due to these findings, recommended pt present to ED for further testing and treatment    Kaitlin Murillo RN

## 2025-04-17 ENCOUNTER — OFFICE VISIT (OUTPATIENT)
Dept: CARDIOLOGY | Facility: CLINIC | Age: 55
End: 2025-04-17
Attending: INTERNAL MEDICINE
Payer: COMMERCIAL

## 2025-04-17 VITALS
WEIGHT: 293 LBS | DIASTOLIC BLOOD PRESSURE: 83 MMHG | HEIGHT: 66 IN | BODY MASS INDEX: 47.09 KG/M2 | RESPIRATION RATE: 16 BRPM | SYSTOLIC BLOOD PRESSURE: 155 MMHG | HEART RATE: 69 BPM

## 2025-04-17 DIAGNOSIS — Z86.79 S/P ABLATION OF ATRIAL FIBRILLATION: ICD-10-CM

## 2025-04-17 DIAGNOSIS — G47.33 OBSTRUCTIVE SLEEP APNEA ON CPAP: ICD-10-CM

## 2025-04-17 DIAGNOSIS — Z98.890 S/P ABLATION OF ATRIAL FIBRILLATION: ICD-10-CM

## 2025-04-17 DIAGNOSIS — I48.0 PAROXYSMAL ATRIAL FIBRILLATION (H): Primary | ICD-10-CM

## 2025-04-17 DIAGNOSIS — I10 ESSENTIAL HYPERTENSION: ICD-10-CM

## 2025-04-17 PROBLEM — I48.91 ATRIAL FIBRILLATION WITH RAPID VENTRICULAR RESPONSE (H): Status: RESOLVED | Noted: 2025-02-13 | Resolved: 2025-04-17

## 2025-04-17 RX ORDER — LOTILANER OPHTHALMIC SOLUTION 2.5 MG/ML
SOLUTION/ DROPS OPHTHALMIC
COMMUNITY
Start: 2025-03-07

## 2025-04-17 NOTE — PATIENT INSTRUCTIONS
Isabelle Gaxiola,    It was a pleasure to see you today at the [facility].     My recommendations after this visit include:    Follow up with PCP for blood pressure management.  Ok to switch from metoprolol to another medication as you no longer need it for A fib    Follow up in 2 months    Abida Sr, CNP  [facility], Electrophysiology  [phone]  EP nurses [phone]

## 2025-04-17 NOTE — LETTER
4/17/2025    Sowmya Hanna MD  [facility] [address] [place] [address]    RE: Isabelle Gaxiola       Dear Colleague,     I had the pleasure of seeing Isabelle [facility] in the [facility].    HEART CARE ELECTROPHYSIOLOGY NOTE      [facility]  [phone]      Assessment/Recommendations   Assessment/Plan:  1.  Paroxysmal atrial fibrillation/Stage 3A: No symptomatology or evidence of AF recurrence.  She was seen in the ED 2/19/2025 for a large right groin hematoma.  Ultrasound negative for pseudoaneurysm.  Hemoglobin stable.  -- Okay to wean metoprolol, but needed for hypertension management    She was reassured that atrial fibrillation is not life-threatening, but carries an increased risk for stroke.  She has a [id number] score of 2 for female gender, HTN.    -- Continue Eliquis 5 mg twice daily for stroke prophylaxis for at least 3 months post ablation    2.  Hypertension: Blood pressure elevated today in clinic, but within target per home readings. Metoprolol no longer needed for A-fib management and interferes with attempts at weight loss, so consider use of other antihypertensive agents.  Follow-up with PCP for further management.      3.  Obstructive sleep apnea: Untreated.  Previously unable to tolerate CPAP.  Discussed correlation between untreated sleep apnea and atrial fibrillation.  Previously referred to sleep medicine for reevaluation.  Recommend treatment.    Follow up 3 months post ablation       History of Present Illness/Subjective    HPI: Isabelle Gaxiola is a 54 year old female who comes in today for EP follow-up of atrial fibrillation.  She has a history of paroxysmal atrial fibrillation, hypertension, hypothyroid, iron deficiency anemia, depression, obstructive sleep apnea (intolerant of CPAP), menorrhagia, and morbid obesity status post Angie-en-Y gastric bypass surgery in 1999.    Arrhythmia history  Dx/date: PAF  1/2018.  Sx: Heart racing/palpitations and neck fullness  [id number] score: 2 for female gender, HTN  Oral anticoagulation: Eliquis 5 mg twice daily  Antiarrhythmic medications: None  AV aquilino blocking agents: Metoprolol  Procedures  DCCV: 1/2018 (ED),12/19/2021 (ED), 1/8/2025(ED)  Ablation: 2/13/2025 by Dr. Shah (RF PVI)    She states that she is feeling well.  She has not had any episodes of A-fib, though has had a few very brief palpitations.  She was seen in the ED 2/19/2025 for a groin hematoma, ultrasound negative for pseudoaneurysm, which is slowly resolving.  She otherwise reports an uneventful recovery from ablation with no significant heartburn, difficulty swallowing, or neurologic changes.  She denies chest discomfort, palpitations, abdominal fullness/bloating or peripheral edema, shortness of breath, paroxysmal nocturnal dyspnea, orthopnea, lightheadedness, dizziness, pre-syncope, or syncope.    Cardiographics (EKG personally reviewed):  EKG done 2/13/2025 shows sinus rhythm at 76 bpm, QRS 82 ms, QT/QTc 400/450 ms.    Right lower extremity arterial duplex ultrasound done 2/19/2025:  1. Large right groin hematoma. No evidence of pseudoaneurysm.     TTE done 1/31/2018:  1. Mild left atrial enlargement.   2. Lipomatous infiltration of the interatrial septum is present.   Agitated saline bubble study was performed to rule-out patent foramen   ovale or atrial septal defect. Findings negative for interatrial   shunting.   3. Left ventricular chamber size is normal. Mild to moderate   concentric wall thickening consistent with left ventricular   hypertrophy is present. A sigmoid septum is seen. Global left   ventricular function is normal. No diagnostic regional wall motion   abnormalities were seen. Left ventricular ejection fraction is   visually estimated at 55%.   4. Normal right ventricle size and normal global function.   5. The cardiac valves are normal.     I have reviewed and updated the  "patient's Past Medical History, Social History, Family History and Medication List.  Outside records personally reviewed.     Physical Examination  Review of Systems   Vitals: BP (!) 155/83 (BP Location: Right arm, Patient Position: Sitting, Cuff Size: Adult Large)   Pulse 69   Resp 16   Ht 1.676 m (5' 6\")   Wt (!) 155.6 kg (343 lb)   BMI 55.36 kg/m    BMI= Body mass index is 55.36 kg/m .  Wt Readings from Last 3 Encounters:   04/17/25 (!) 155.6 kg (343 lb)   02/19/25 (!) 147.9 kg (326 lb)   02/13/25 (!) 147.9 kg (326 lb)       General Appearance:   Alert, well-appearing and in no acute distress.   HEENT: Atraumatic, normocephalic.  No scleral icterus, normal conjunctivae, EOMs intact, PERRL.  Mucous membranes pink and moist.     Chest/Lungs:   Chest symmetric, spine straight.  Respirations unlabored.  Lungs are clear to auscultation.   Cardiovascular:   Regular rate and rhythm.  Normal first and second heart sounds with no murmurs, rubs, or gallops; radial pulses are intact, trace bilateral lower extremity edema.   Abdomen:  Soft, nondistended, bowel sounds present.   Extremities: No cyanosis or clubbing.   Musculoskeletal: Moves all extremities.    Skin: Warm, dry, intact.    Neurologic: Mood and affect are appropriate.  Alert and oriented to person, place, time, and situation.     ROS: 10 point ROS neg other than the symptoms noted above in the HPI.         Medical History  Surgical History Family History Social History   Past Medical History:   Diagnosis Date     Depression 09/09/2008    As a teenager       Essential hypertension 04/13/2018     Obstructive sleep apnea on CPAP 06/19/2018    Mild  Setting: Auto 5/15  Supplied by: [email or web address]  PSG done: 6/05/2018  AHI 11 (HTN)  RDI 12  Lowest O2 Sat: 82%  [facility]  New 6/19/2018, Supplies [email or web address]  Ed ord 5/14/2018 [facility]       Paroxysmal atrial fibrillation (H) 01/22/2018     Past Surgical History:   Procedure Laterality Date     EP ABLATION " "PULMONARY VEIN ISOLATION N/A 2/13/2025    Procedure: Ablation Atrial Fibrillation;  Surgeon: Juliano Shah MD;  Location: [place] [facility]     No family history on file.     Social History     Socioeconomic History     Marital status: Single     Spouse name: Not on file     Number of children: Not on file     Years of education: Not on file     Highest education level: Not on file   Occupational History     Not on file   Tobacco Use     Smoking status: Former     Types: Cigarettes     Smokeless tobacco: Never   Substance and Sexual Activity     Alcohol use: Not on file     Drug use: Not on file     Sexual activity: Not on file   Other Topics Concern     Not on file   Social History Narrative     Not on file     Social Drivers of Health     Financial Resource Strain: Not on file   Food Insecurity: Not on file   Transportation Needs: Not on file   Physical Activity: Not on file   Stress: Not on file   Social Connections: Not on file   Interpersonal Safety: Not on file   Housing Stability: Not on file           Medications  Allergies   Current Outpatient Medications   Medication Sig Dispense Refill     albuterol (PROAIR HFA/PROVENTIL HFA/VENTOLIN HFA) 108 (90 Base) MCG/ACT inhaler Inhale 1-2 puffs into the lungs every 4 hours as needed for shortness of breath or wheezing.       apixaban ANTICOAGULANT (ELIQUIS) 5 MG tablet Take 5 mg by mouth 2 times daily.       cholecalciferol 50 MCG (2000 UT) CAPS Take 2,000 Units by mouth daily.       ferrous gluconate (FERGON) 324 (38 Fe) MG tablet Take 324 mg by mouth daily (with breakfast).       Methylcobalamin 1000 MCG SUBL Place 1 tablet under the tongue every 7 days.       metoprolol succinate ER (TOPROL XL) 50 MG 24 hr tablet Take 100 mg by mouth daily.       XDEMVY 0.25 % SOLN INSTILL 1 DROP INTO BOTH EYES TWICE DAILY FOR 6 WEEKS         Allergies   Allergen Reactions     Prednisone Other (See Comments)     \"Makes me angry\"     Keflex [Cephalexin] Rash          Lab " "Results    Chemistry/lipid CBC Cardiac Enzymes/BNP/TSH/INR   No results for input(s): \"CHOL\", \"HDL\", \"LDL\", \"TRIG\", \"CHOLHDLRATIO\" in the last 20606 hours.  No results for input(s): \"LDL\" in the last 93629 hours.  Recent Labs   Lab Test 02/13/25  0709      POTASSIUM 3.8   CHLORIDE 108*   CO2 28   *   BUN 15.9   CR 0.66   GFRESTIMATED >90   VIOLETA 9.4     Recent Labs   Lab Test 02/13/25  0709 02/05/25  1502 01/12/25  1517   CR 0.66 0.67 0.66     No results for input(s): \"A1C\" in the last 76220 hours.   Recent Labs   Lab Test 02/19/25  1259   WBC 7.3   HGB 12.1   HCT 38.5   MCV 77*        Recent Labs   Lab Test 02/19/25  1259 02/13/25  0709 02/05/25  1502   HGB 12.1 12.5 14.3    No results for input(s): \"TROPONINI\" in the last 97982 hours.  Recent Labs   Lab Test 01/08/25  1936   NTBNPI 124     Recent Labs   Lab Test 01/08/25  1936   TSH 4.92*     No results for input(s): \"INR\" in the last 87247 hours.   The longitudinal plan of care for the diagnosis(es)/condition(s) as documented were addressed during this visit. Due to the added complexity in care, I will continue to support Isabelle in the subsequent management and with ongoing continuity of care.                                            Thank you for allowing me to participate in the care of your patient.      Sincerely,     LUIS Tate CNP     [facility]  cc:   LUIS Tate CNP  [address], [address]  [place] [address]      "

## 2025-04-17 NOTE — PROGRESS NOTES
HEART CARE ELECTROPHYSIOLOGY NOTE      [facility]  [phone]      Assessment/Recommendations   Assessment/Plan:  1.  Paroxysmal atrial fibrillation/Stage 3A: No symptomatology or evidence of AF recurrence.  She was seen in the ED 2/19/2025 for a large right groin hematoma.  Ultrasound negative for pseudoaneurysm.  Hemoglobin stable.  -- Okay to wean metoprolol, but needed for hypertension management    She was reassured that atrial fibrillation is not life-threatening, but carries an increased risk for stroke.  She has a [id number] score of 2 for female gender, HTN.    -- Continue Eliquis 5 mg twice daily for stroke prophylaxis for at least 3 months post ablation    2.  Hypertension: Blood pressure elevated today in clinic, but within target per home readings. Metoprolol no longer needed for A-fib management and interferes with attempts at weight loss, so consider use of other antihypertensive agents.  Follow-up with PCP for further management.      3.  Obstructive sleep apnea: Untreated.  Previously unable to tolerate CPAP.  Discussed correlation between untreated sleep apnea and atrial fibrillation.  Previously referred to sleep medicine for reevaluation.  Recommend treatment.    Follow up 3 months post ablation       History of Present Illness/Subjective    HPI: Isabelle Gaxiola is a 54 year old female who comes in today for EP follow-up of atrial fibrillation.  She has a history of paroxysmal atrial fibrillation, hypertension, hypothyroid, iron deficiency anemia, depression, obstructive sleep apnea (intolerant of CPAP), menorrhagia, and morbid obesity status post Angie-en-Y gastric bypass surgery in 1999.    Arrhythmia history  Dx/date: PAF 1/2018.  Sx: Heart racing/palpitations and neck fullness  [id number] score: 2 for female gender, HTN  Oral anticoagulation: Eliquis 5 mg twice daily  Antiarrhythmic medications: None  AV aquilino blocking agents: Metoprolol  Procedures  DCCV: 1/2018  (ED),12/19/2021 (ED), 1/8/2025(ED)  Ablation: 2/13/2025 by Dr. Shah (RF PVI)    She states that she is feeling well.  She has not had any episodes of A-fib, though has had a few very brief palpitations.  She was seen in the ED 2/19/2025 for a groin hematoma, ultrasound negative for pseudoaneurysm, which is slowly resolving.  She otherwise reports an uneventful recovery from ablation with no significant heartburn, difficulty swallowing, or neurologic changes.  She denies chest discomfort, palpitations, abdominal fullness/bloating or peripheral edema, shortness of breath, paroxysmal nocturnal dyspnea, orthopnea, lightheadedness, dizziness, pre-syncope, or syncope.    Cardiographics (EKG personally reviewed):  EKG done 2/13/2025 shows sinus rhythm at 76 bpm, QRS 82 ms, QT/QTc 400/450 ms.    Right lower extremity arterial duplex ultrasound done 2/19/2025:  1. Large right groin hematoma. No evidence of pseudoaneurysm.     TTE done 1/31/2018:  1. Mild left atrial enlargement.   2. Lipomatous infiltration of the interatrial septum is present.   Agitated saline bubble study was performed to rule-out patent foramen   ovale or atrial septal defect. Findings negative for interatrial   shunting.   3. Left ventricular chamber size is normal. Mild to moderate   concentric wall thickening consistent with left ventricular   hypertrophy is present. A sigmoid septum is seen. Global left   ventricular function is normal. No diagnostic regional wall motion   abnormalities were seen. Left ventricular ejection fraction is   visually estimated at 55%.   4. Normal right ventricle size and normal global function.   5. The cardiac valves are normal.     I have reviewed and updated the patient's Past Medical History, Social History, Family History and Medication List.  Outside records personally reviewed.     Physical Examination  Review of Systems   Vitals: BP (!) 155/83 (BP Location: Right arm, Patient Position: Sitting, Cuff Size:  "Adult Large)   Pulse 69   Resp 16   Ht 1.676 m (5' 6\")   Wt (!) 155.6 kg (343 lb)   BMI 55.36 kg/m    BMI= Body mass index is 55.36 kg/m .  Wt Readings from Last 3 Encounters:   04/17/25 (!) 155.6 kg (343 lb)   02/19/25 (!) 147.9 kg (326 lb)   02/13/25 (!) 147.9 kg (326 lb)       General Appearance:   Alert, well-appearing and in no acute distress.   HEENT: Atraumatic, normocephalic.  No scleral icterus, normal conjunctivae, EOMs intact, PERRL.  Mucous membranes pink and moist.     Chest/Lungs:   Chest symmetric, spine straight.  Respirations unlabored.  Lungs are clear to auscultation.   Cardiovascular:   Regular rate and rhythm.  Normal first and second heart sounds with no murmurs, rubs, or gallops; radial pulses are intact, trace bilateral lower extremity edema.   Abdomen:  Soft, nondistended, bowel sounds present.   Extremities: No cyanosis or clubbing.   Musculoskeletal: Moves all extremities.    Skin: Warm, dry, intact.    Neurologic: Mood and affect are appropriate.  Alert and oriented to person, place, time, and situation.     ROS: 10 point ROS neg other than the symptoms noted above in the HPI.         Medical History  Surgical History Family History Social History   Past Medical History:   Diagnosis Date    Depression 09/09/2008    As a teenager      Essential hypertension 04/13/2018    Obstructive sleep apnea on CPAP 06/19/2018    Mild  Setting: Auto 5/15  Supplied by: [email or web address]  PSG done: 6/05/2018  AHI 11 (HTN)  RDI 12  Lowest O2 Sat: 82%  [facility]  New 6/19/2018, Supplies [email or web address]  Ed ord 5/14/2018 [facility]      Paroxysmal atrial fibrillation (H) 01/22/2018     Past Surgical History:   Procedure Laterality Date    EP ABLATION PULMONARY VEIN ISOLATION N/A 2/13/2025    Procedure: Ablation Atrial Fibrillation;  Surgeon: Juliano Shah MD;  Location: [facility]     No family history on file.     Social History     Socioeconomic History    Marital status: Single     " "Spouse name: Not on file    Number of children: Not on file    Years of education: Not on file    Highest education level: Not on file   Occupational History    Not on file   Tobacco Use    Smoking status: Former     Types: Cigarettes    Smokeless tobacco: Never   Substance and Sexual Activity    Alcohol use: Not on file    Drug use: Not on file    Sexual activity: Not on file   Other Topics Concern    Not on file   Social History Narrative    Not on file     Social Drivers of Health     Financial Resource Strain: Not on file   Food Insecurity: Not on file   Transportation Needs: Not on file   Physical Activity: Not on file   Stress: Not on file   Social Connections: Not on file   Interpersonal Safety: Not on file   Housing Stability: Not on file           Medications  Allergies   Current Outpatient Medications   Medication Sig Dispense Refill    albuterol (PROAIR HFA/PROVENTIL HFA/VENTOLIN HFA) 108 (90 Base) MCG/ACT inhaler Inhale 1-2 puffs into the lungs every 4 hours as needed for shortness of breath or wheezing.      apixaban ANTICOAGULANT (ELIQUIS) 5 MG tablet Take 5 mg by mouth 2 times daily.      cholecalciferol 50 MCG (2000 UT) CAPS Take 2,000 Units by mouth daily.      ferrous gluconate (FERGON) 324 (38 Fe) MG tablet Take 324 mg by mouth daily (with breakfast).      Methylcobalamin 1000 MCG SUBL Place 1 tablet under the tongue every 7 days.      metoprolol succinate ER (TOPROL XL) 50 MG 24 hr tablet Take 100 mg by mouth daily.      XDEMVY 0.25 % SOLN INSTILL 1 DROP INTO BOTH EYES TWICE DAILY FOR 6 WEEKS         Allergies   Allergen Reactions    Prednisone Other (See Comments)     \"Makes me angry\"    Keflex [Cephalexin] Rash          Lab Results    Chemistry/lipid CBC Cardiac Enzymes/BNP/TSH/INR   No results for input(s): \"CHOL\", \"HDL\", \"LDL\", \"TRIG\", \"CHOLHDLRATIO\" in the last 02652 hours.  No results for input(s): \"LDL\" in the last 56343 hours.  Recent Labs   Lab Test 02/13/25  0709      POTASSIUM " "3.8   CHLORIDE 108*   CO2 28   *   BUN 15.9   CR 0.66   GFRESTIMATED >90   VIOLETA 9.4     Recent Labs   Lab Test 02/13/25  0709 02/05/25  1502 01/12/25  1517   CR 0.66 0.67 0.66     No results for input(s): \"A1C\" in the last 73715 hours.   Recent Labs   Lab Test 02/19/25  1259   WBC 7.3   HGB 12.1   HCT 38.5   MCV 77*        Recent Labs   Lab Test 02/19/25  1259 02/13/25  0709 02/05/25  1502   HGB 12.1 12.5 14.3    No results for input(s): \"TROPONINI\" in the last 29508 hours.  Recent Labs   Lab Test 01/08/25  1936   NTBNPI 124     Recent Labs   Lab Test 01/08/25  1936   TSH 4.92*     No results for input(s): \"INR\" in the last 13513 hours.   The longitudinal plan of care for the diagnosis(es)/condition(s) as documented were addressed during this visit. Due to the added complexity in care, I will continue to support Isabelle in the subsequent management and with ongoing continuity of care.                                        "

## 2025-06-12 ENCOUNTER — OFFICE VISIT (OUTPATIENT)
Dept: CARDIOLOGY | Facility: CLINIC | Age: 55
End: 2025-06-12
Payer: COMMERCIAL

## 2025-06-12 VITALS
OXYGEN SATURATION: 96 % | HEART RATE: 93 BPM | RESPIRATION RATE: 16 BRPM | SYSTOLIC BLOOD PRESSURE: 156 MMHG | DIASTOLIC BLOOD PRESSURE: 97 MMHG | BODY MASS INDEX: 54.72 KG/M2 | WEIGHT: 293 LBS

## 2025-06-12 DIAGNOSIS — Z86.79 S/P ABLATION OF ATRIAL FIBRILLATION: ICD-10-CM

## 2025-06-12 DIAGNOSIS — I10 ESSENTIAL HYPERTENSION: ICD-10-CM

## 2025-06-12 DIAGNOSIS — I48.0 PAROXYSMAL ATRIAL FIBRILLATION (H): Primary | ICD-10-CM

## 2025-06-12 DIAGNOSIS — Z98.890 S/P ABLATION OF ATRIAL FIBRILLATION: ICD-10-CM

## 2025-06-12 RX ORDER — LOSARTAN POTASSIUM 25 MG/1
25 TABLET ORAL DAILY
COMMUNITY
Start: 2025-05-13 | End: 2026-05-13

## 2025-06-12 RX ORDER — AZELASTINE 1 MG/ML
1 SPRAY, METERED NASAL 2 TIMES DAILY
COMMUNITY

## 2025-06-12 RX ORDER — BUDESONIDE AND FORMOTEROL FUMARATE DIHYDRATE 160; 4.5 UG/1; UG/1
AEROSOL RESPIRATORY (INHALATION)
COMMUNITY

## 2025-06-12 NOTE — PROGRESS NOTES
HEART CARE ELECTROPHYSIOLOGY NOTE      [facility]  [phone]      Assessment/Recommendations   Assessment/Plan:  1.  Paroxysmal atrial fibrillation/Stage 3A: No symptomatology or evidence of AF recurrence.  She remains off membrane active antiarrhythmic medications.  Reviewed avoidance of possible triggers    She was reassured that atrial fibrillation is not life-threatening, but carries an increased risk for stroke.  She has a [id number] score of 2 for female gender, HTN.  We discussed risk for AF recurrence post ablation and risk for stroke.  She is very symptomatic with AF.  Will evaluate need for long-term OAC at 1 year post ablation if hypertension is well-managed at that time.  -- Continue Eliquis 5 mg twice daily for stroke prophylaxis     2.  Hypertension: Uncontrolled.   Metoprolol XL decreased to 50 mg daily and started on losartan 25 mg daily.  Blood pressure still elevated.  Working with PCP for management.     3.  Obstructive sleep apnea: Untreated.  Previously unable to tolerate CPAP.  Discussed correlation between untreated sleep apnea and atrial fibrillation.  Previously referred to sleep medicine for reevaluation.  Recommend treatment.    Follow up 1 year post ablation       History of Present Illness/Subjective    HPI: Isabelle Gaxiola is a 54 year old female who comes in today for EP follow-up of atrial fibrillation.  She has a history of paroxysmal atrial fibrillation, hypertension, hypothyroid, iron deficiency anemia, depression, obstructive sleep apnea (intolerant of CPAP), menorrhagia, and morbid obesity status post Angie-en-Y gastric bypass surgery in 1999.    Arrhythmia history  Dx/date: PAF 1/2018.  Sx: Heart racing/palpitations and neck fullness  [id number] score: 2 for female gender, HTN  Oral anticoagulation: Eliquis 5 mg twice daily  Antiarrhythmic medications: None  AV aquilino blocking agents: Metoprolol  Procedures  DCCV: 1/2018 (ED),12/19/2021 (ED),  1/8/2025(ED)  Ablation: 2/13/2025 by Dr. Shah (RF PVI)    She states that she is feeling well.  She has not had any AF.  Right groin hematoma has resolved.  She denies chest discomfort, sustained palpitations, abdominal fullness/bloating or peripheral edema, shortness of breath, paroxysmal nocturnal dyspnea, orthopnea, lightheadedness, dizziness, pre-syncope, or syncope.    Cardiographics (EKG personally reviewed):  EKG done 2/13/2025 shows sinus rhythm at 76 bpm, QRS 82 ms, QT/QTc 400/450 ms.    Right lower extremity arterial duplex ultrasound done 2/19/2025:  1. Large right groin hematoma. No evidence of pseudoaneurysm.     TTE done 1/31/2018:  1. Mild left atrial enlargement.   2. Lipomatous infiltration of the interatrial septum is present.   Agitated saline bubble study was performed to rule-out patent foramen   ovale or atrial septal defect. Findings negative for interatrial   shunting.   3. Left ventricular chamber size is normal. Mild to moderate   concentric wall thickening consistent with left ventricular   hypertrophy is present. A sigmoid septum is seen. Global left   ventricular function is normal. No diagnostic regional wall motion   abnormalities were seen. Left ventricular ejection fraction is   visually estimated at 55%.   4. Normal right ventricle size and normal global function.   5. The cardiac valves are normal.     I have reviewed and updated the patient's Past Medical History, Social History, Family History and Medication List.  Outside records personally reviewed.     Physical Examination  Review of Systems   Vitals: BP (!) 156/97 (BP Location: Left arm, Patient Position: Sitting, Cuff Size: Adult Small)   Pulse 93   Resp 16   Wt (!) 153.8 kg (339 lb)   SpO2 96%   BMI 54.72 kg/m    BMI= Body mass index is 54.72 kg/m .  Wt Readings from Last 3 Encounters:   06/12/25 (!) 153.8 kg (339 lb)   04/17/25 (!) 155.6 kg (343 lb)   02/19/25 (!) 147.9 kg (326 lb)       General Appearance:    Alert, well-appearing and in no acute distress.   HEENT: Atraumatic, normocephalic.  No scleral icterus, normal conjunctivae, EOMs intact, PERRL.  Mucous membranes pink and moist.     Chest/Lungs:   Chest symmetric, spine straight.  Respirations unlabored.  Lungs are clear to auscultation.   Cardiovascular:   Regular rate and rhythm.  Normal first and second heart sounds with no murmurs, rubs, or gallops; radial pulses are intact, trace bilateral lower extremity edema.   Abdomen:  Soft, nondistended.   Extremities: No cyanosis or clubbing.   Musculoskeletal: Moves all extremities.    Skin: Warm, dry, intact.    Neurologic: Mood and affect are appropriate.  Alert and oriented to person, place, time, and situation.     ROS: 10 point ROS neg other than the symptoms noted above in the HPI.         Medical History  Surgical History Family History Social History   Past Medical History:   Diagnosis Date    Depression 09/09/2008    As a teenager      Essential hypertension 04/13/2018    Obstructive sleep apnea on CPAP 06/19/2018    Mild  Setting: Auto 5/15  Supplied by: [email or web address]  PSG done: 6/05/2018  AHI 11 (HTN)  RDI 12  Lowest O2 Sat: 82%  [facility]  New 6/19/2018, Supplies [email or web address]  Ed ord 5/14/2018 [facility]      Paroxysmal atrial fibrillation (H) 01/22/2018     Past Surgical History:   Procedure Laterality Date    EP ABLATION PULMONARY VEIN ISOLATION N/A 2/13/2025    Procedure: Ablation Atrial Fibrillation;  Surgeon: Juliano Shah MD;  Location: [facility] LAB CV     History reviewed. No pertinent family history.     Social History     Socioeconomic History    Marital status: Single     Spouse name: Not on file    Number of children: Not on file    Years of education: Not on file    Highest education level: Not on file   Occupational History    Not on file   Tobacco Use    Smoking status: Former     Types: Cigarettes    Smokeless tobacco: Never   Substance and Sexual Activity    Alcohol use: Not  "on file    Drug use: Not on file    Sexual activity: Not on file   Other Topics Concern    Not on file   Social History Narrative    Not on file     Social Drivers of Health     Financial Resource Strain: Not on file   Food Insecurity: Not on file   Transportation Needs: Not on file   Physical Activity: Not on file   Stress: Not on file   Social Connections: Not on file   Interpersonal Safety: Not on file   Housing Stability: Not on file           Medications  Allergies   Current Outpatient Medications   Medication Sig Dispense Refill    albuterol (PROAIR HFA/PROVENTIL HFA/VENTOLIN HFA) 108 (90 Base) MCG/ACT inhaler Inhale 1-2 puffs into the lungs every 4 hours as needed for shortness of breath or wheezing.      apixaban ANTICOAGULANT (ELIQUIS) 5 MG tablet Take 1 tablet (5 mg) by mouth 2 times daily. 180 tablet 3    azelastine (ASTELIN) 0.1 % nasal spray Spray 1 spray into both nostrils 2 times daily.      budesonide-formoterol (SYMBICORT/BREYNA) 160-4.5 MCG/ACT inhaler INHALE 2 PUFFS BY MOUTH TWICE DAILY. MAY ALSO USE 1-2 PUFF 4 TIMES DAILY AS NEEDED COUGH OR SHORTNESS OF BREATH      cholecalciferol 50 MCG (2000 UT) CAPS Take 2,000 Units by mouth daily.      ferrous gluconate (FERGON) 324 (38 Fe) MG tablet Take 324 mg by mouth daily (with breakfast).      losartan (COZAAR) 25 MG tablet Take 25 mg by mouth daily.      Methylcobalamin 1000 MCG SUBL Place 1 tablet under the tongue every 7 days.      metoprolol succinate ER (TOPROL XL) 50 MG 24 hr tablet Take 50 mg by mouth daily.      XDEMVY 0.25 % SOLN INSTILL 1 DROP INTO BOTH EYES TWICE DAILY FOR 6 WEEKS         Allergies   Allergen Reactions    Prednisone Other (See Comments)     \"Makes me angry\"    Keflex [Cephalexin] Rash          Lab Results    Chemistry/lipid CBC Cardiac Enzymes/BNP/TSH/INR   No results for input(s): \"CHOL\", \"HDL\", \"LDL\", \"TRIG\", \"CHOLHDLRATIO\" in the last 03921 hours.  No results for input(s): \"LDL\" in the last 40377 hours.  Recent Labs   Lab " "Test 02/13/25  0709      POTASSIUM 3.8   CHLORIDE 108*   CO2 28   *   BUN 15.9   CR 0.66   GFRESTIMATED >90   VIOLETA 9.4     Recent Labs   Lab Test 02/13/25  0709 02/05/25  1502 01/12/25  1517   CR 0.66 0.67 0.66      Recent Labs   Lab Test 02/19/25  1259   WBC 7.3   HGB 12.1   HCT 38.5   MCV 77*        Recent Labs   Lab Test 02/19/25  1259 02/13/25  0709 02/05/25  1502   HGB 12.1 12.5 14.3    No results for input(s): \"TROPONINI\" in the last 57116 hours.  Recent Labs   Lab Test 01/08/25  1936   NTBNPI 124     Recent Labs   Lab Test 01/08/25  1936   TSH 4.92*     No results for input(s): \"INR\" in the last 22338 hours.   The longitudinal plan of care for the diagnosis(es)/condition(s) as documented were addressed during this visit. Due to the added complexity in care, I will continue to support Isabelle in the subsequent management and with ongoing continuity of care.                                        "

## 2025-06-12 NOTE — PATIENT INSTRUCTIONS
Isabelle Gaxiola,    It was a pleasure to see you today at the [facility].     My recommendations after this visit include:    Continue Eliquis for now, at least until blood pressure controlled    Follow up in February--1 year post-ablation, or sooner if needed    Abida Sr, CNP  [facility], Electrophysiology  [phone]  EP nurses [phone]

## 2025-06-12 NOTE — LETTER
6/12/2025    Sowmya Hanna MD  [facility] [address] [place] [address]    RE: Isabelle Gaxiola       Dear Colleague,     I had the [facility] Isabel[facility].    HEART CARE ELECTROPHYSIOLOGY NOTE      [facility]  [phone]      Assessment/Recommendations   Assessment/Plan:  1.  Paroxysmal atrial fibrillation/Stage 3A: No symptomatology or evidence of AF recurrence.  She remains off membrane active antiarrhythmic medications.  Reviewed avoidance of possible triggers    She was reassured that atrial fibrillation is not life-threatening, but carries an increased risk for stroke.  She has a [id number] score of 2 for female gender, HTN.  We discussed risk for AF recurrence post ablation and risk for stroke.  She is very symptomatic with AF.  Will evaluate need for long-term OAC at 1 year post ablation if hypertension is well-managed at that time.  -- Continue Eliquis 5 mg twice daily for stroke prophylaxis     2.  Hypertension: Uncontrolled.   Metoprolol XL decreased to 50 mg daily and started on losartan 25 mg daily.  Blood pressure still elevated.  Working with PCP for management.     3.  Obstructive sleep apnea: Untreated.  Previously unable to tolerate CPAP.  Discussed correlation between untreated sleep apnea and atrial fibrillation.  Previously referred to sleep medicine for reevaluation.  Recommend treatment.    Follow up 1 year post ablation       History of Present Illness/Subjective    HPI: Isabelle Gaxiola is a 54 year old female who comes in today for EP follow-up of atrial fibrillation.  She has a history of paroxysmal atrial fibrillation, hypertension, hypothyroid, iron deficiency anemia, depression, obstructive sleep apnea (intolerant of CPAP), menorrhagia, and morbid obesity status post Angie-en-Y gastric bypass surgery in 1999.    Arrhythmia history  Dx/date: PAF 1/2018.  Sx: Heart racing/palpitations  and neck fullness  [id number] score: 2 for female gender, HTN  Oral anticoagulation: Eliquis 5 mg twice daily  Antiarrhythmic medications: None  AV aquilino blocking agents: Metoprolol  Procedures  DCCV: 1/2018 (ED),12/19/2021 (ED), 1/8/2025(ED)  Ablation: 2/13/2025 by Dr. Shah (RF PVI)    She states that she is feeling well.  She has not had any AF.  Right groin hematoma has resolved.  She denies chest discomfort, sustained palpitations, abdominal fullness/bloating or peripheral edema, shortness of breath, paroxysmal nocturnal dyspnea, orthopnea, lightheadedness, dizziness, pre-syncope, or syncope.    Cardiographics (EKG personally reviewed):  EKG done 2/13/2025 shows sinus rhythm at 76 bpm, QRS 82 ms, QT/QTc 400/450 ms.    Right lower extremity arterial duplex ultrasound done 2/19/2025:  1. Large right groin hematoma. No evidence of pseudoaneurysm.     TTE done 1/31/2018:  1. Mild left atrial enlargement.   2. Lipomatous infiltration of the interatrial septum is present.   Agitated saline bubble study was performed to rule-out patent foramen   ovale or atrial septal defect. Findings negative for interatrial   shunting.   3. Left ventricular chamber size is normal. Mild to moderate   concentric wall thickening consistent with left ventricular   hypertrophy is present. A sigmoid septum is seen. Global left   ventricular function is normal. No diagnostic regional wall motion   abnormalities were seen. Left ventricular ejection fraction is   visually estimated at 55%.   4. Normal right ventricle size and normal global function.   5. The cardiac valves are normal.     I have reviewed and updated the patient's Past Medical History, Social History, Family History and Medication List.  Outside records personally reviewed.     Physical Examination  Review of Systems   Vitals: BP (!) 156/97 (BP Location: Left arm, Patient Position: Sitting, Cuff Size: Adult Small)   Pulse 93   Resp 16   Wt (!) 153.8 kg (339 lb)    SpO2 96%   BMI 54.72 kg/m    BMI= Body mass index is 54.72 kg/m .  Wt Readings from Last 3 Encounters:   06/12/25 (!) 153.8 kg (339 lb)   04/17/25 (!) 155.6 kg (343 lb)   02/19/25 (!) 147.9 kg (326 lb)       General Appearance:   Alert, well-appearing and in no acute distress.   HEENT: Atraumatic, normocephalic.  No scleral icterus, normal conjunctivae, EOMs intact, PERRL.  Mucous membranes pink and moist.     Chest/Lungs:   Chest symmetric, spine straight.  Respirations unlabored.  Lungs are clear to auscultation.   Cardiovascular:   Regular rate and rhythm.  Normal first and second heart sounds with no murmurs, rubs, or gallops; radial pulses are intact, trace bilateral lower extremity edema.   Abdomen:  Soft, nondistended.   Extremities: No cyanosis or clubbing.   Musculoskeletal: Moves all extremities.    Skin: Warm, dry, intact.    Neurologic: Mood and affect are appropriate.  Alert and oriented to person, place, time, and situation.     ROS: 10 point ROS neg other than the symptoms noted above in the HPI.         Medical History  Surgical History Family History Social History   Past Medical History:   Diagnosis Date     Depression 09/09/2008    As a teenager       Essential hypertension 04/13/2018     Obstructive sleep apnea on CPAP 06/19/2018    Mild  Setting: Auto 5/15  Supplied by: [email or web address]  PSG done: 6/05/2018  AHI 11 (HTN)  RDI 12  Lowest O2 Sat: 82%  [facility]  New 6/19/2018, Supplies [email or web address]  Ed ord 5/14/2018 [facility]       Paroxysmal atrial fibrillation (H) 01/22/2018     Past Surgical History:   Procedure Laterality Date     EP ABLATION PULMONARY VEIN ISOLATION N/A 2/13/2025    Procedure: Ablation Atrial Fibrillation;  Surgeon: Juliano Shah MD;  Location: [facility] CV     History reviewed. No pertinent family history.     Social History     Socioeconomic History     Marital status: Single     Spouse name: Not on file     Number of children: Not on file     Years of  "education: Not on file     Highest education level: Not on file   Occupational History     Not on file   Tobacco Use     Smoking status: Former     Types: Cigarettes     Smokeless tobacco: Never   Substance and Sexual Activity     Alcohol use: Not on file     Drug use: Not on file     Sexual activity: Not on file   Other Topics Concern     Not on file   Social History Narrative     Not on file     Social Drivers of Health     Financial Resource Strain: Not on file   Food Insecurity: Not on file   Transportation Needs: Not on file   Physical Activity: Not on file   Stress: Not on file   Social Connections: Not on file   Interpersonal Safety: Not on file   Housing Stability: Not on file           Medications  Allergies   Current Outpatient Medications   Medication Sig Dispense Refill     albuterol (PROAIR HFA/PROVENTIL HFA/VENTOLIN HFA) 108 (90 Base) MCG/ACT inhaler Inhale 1-2 puffs into the lungs every 4 hours as needed for shortness of breath or wheezing.       apixaban ANTICOAGULANT (ELIQUIS) 5 MG tablet Take 1 tablet (5 mg) by mouth 2 times daily. 180 tablet 3     azelastine (ASTELIN) 0.1 % nasal spray Spray 1 spray into both nostrils 2 times daily.       budesonide-formoterol (SYMBICORT/BREYNA) 160-4.5 MCG/ACT inhaler INHALE 2 PUFFS BY MOUTH TWICE DAILY. MAY ALSO USE 1-2 PUFF 4 TIMES DAILY AS NEEDED COUGH OR SHORTNESS OF BREATH       cholecalciferol 50 MCG (2000 UT) CAPS Take 2,000 Units by mouth daily.       ferrous gluconate (FERGON) 324 (38 Fe) MG tablet Take 324 mg by mouth daily (with breakfast).       losartan (COZAAR) 25 MG tablet Take 25 mg by mouth daily.       Methylcobalamin 1000 MCG SUBL Place 1 tablet under the tongue every 7 days.       metoprolol succinate ER (TOPROL XL) 50 MG 24 hr tablet Take 50 mg by mouth daily.       XDEMVY 0.25 % SOLN INSTILL 1 DROP INTO BOTH EYES TWICE DAILY FOR 6 WEEKS         Allergies   Allergen Reactions     Prednisone Other (See Comments)     \"Makes me angry\"     Keflex " "[Cephalexin] Rash          Lab Results    Chemistry/lipid CBC Cardiac Enzymes/BNP/TSH/INR   No results for input(s): \"CHOL\", \"HDL\", \"LDL\", \"TRIG\", \"CHOLHDLRATIO\" in the last 11806 hours.  No results for input(s): \"LDL\" in the last 72146 hours.  Recent Labs   Lab Test 02/13/25  0709      POTASSIUM 3.8   CHLORIDE 108*   CO2 28   *   BUN 15.9   CR 0.66   GFRESTIMATED >90   VIOLETA 9.4     Recent Labs   Lab Test 02/13/25  0709 02/05/25  1502 01/12/25  1517   CR 0.66 0.67 0.66      Recent Labs   Lab Test 02/19/25  1259   WBC 7.3   HGB 12.1   HCT 38.5   MCV 77*        Recent Labs   Lab Test 02/19/25  1259 02/13/25  0709 02/05/25  1502   HGB 12.1 12.5 14.3    No results for input(s): \"TROPONINI\" in the last 33773 hours.  Recent Labs   Lab Test 01/08/25  1936   NTBNPI 124     Recent Labs   Lab Test 01/08/25  1936   TSH 4.92*     No results for input(s): \"INR\" in the last 70435 hours.   The longitudinal plan of care for the diagnosis(es)/condition(s) as documented were addressed during this visit. Due to the added complexity in care, I will continue to support Isabelle in the subsequent management and with ongoing continuity of care.                                          Thank you for allowing me to participate in the care of your patient.      Sincerely,     LUIS Tate CNP     [facility]  cc:   LUIS Tate CNP  [address], [address]  [place] [address]      "

## (undated) DEVICE — STEERABLE SHEATH CLEAR, 13F

## (undated) DEVICE — 8F SOUNDSTAR ECO ULTRASOUND CATHETER

## (undated) DEVICE — CUSTOM PACK EP

## (undated) DEVICE — Device

## (undated) DEVICE — PULSED FIELD ABLATION CATHETER, 31MM

## (undated) DEVICE — INTRODUCER CHECK FLO 18FRX30CM .038 RCFW-18.0P-38-30-RB

## (undated) DEVICE — INTRO MICRO MINI STICK 4FR STIFF NITINOL 45-753

## (undated) DEVICE — CATHETER OCTARAY LONG SPLINE CURVE F 3-3-3-3-3 D160906

## (undated) DEVICE — GUIDE WIRE SHEATH VERSACROSS D1 CURVE 93CM L180 VXAK0041

## (undated) DEVICE — INTRO TERUMO 8FRX25CM W/MARKER RSB803

## (undated) DEVICE — PATCH CARTO 3 EXTERNAL REFERENCE 3D MAPPING CREFP6

## (undated) DEVICE — TRANSDUCER TRAY ARTERIAL 42646-06

## (undated) DEVICE — ELECTRODE DEFIB CADENCE 22550R

## (undated) DEVICE — SHEATH PINNACLE 9FR 10CM W/MARKER

## (undated) DEVICE — 300CM, CATHETER CONNECTION CABLE

## (undated) DEVICE — CATH EP 7FR X 115CM DECANAV CA

## (undated) RX ORDER — HEPARIN SODIUM 1000 [USP'U]/ML
INJECTION, SOLUTION INTRAVENOUS; SUBCUTANEOUS
Status: DISPENSED
Start: 2025-02-13

## (undated) RX ORDER — PROTAMINE SULFATE 10 MG/ML
INJECTION, SOLUTION INTRAVENOUS
Status: DISPENSED
Start: 2025-02-13

## (undated) RX ORDER — FENTANYL CITRATE 50 UG/ML
INJECTION, SOLUTION INTRAMUSCULAR; INTRAVENOUS
Status: DISPENSED
Start: 2025-02-13

## (undated) RX ORDER — OXYCODONE HYDROCHLORIDE 5 MG/1
TABLET ORAL
Status: DISPENSED
Start: 2025-02-13

## (undated) RX ORDER — METOPROLOL TARTRATE 1 MG/ML
INJECTION, SOLUTION INTRAVENOUS
Status: DISPENSED
Start: 2025-02-13

## (undated) RX ORDER — KETOROLAC TROMETHAMINE 15 MG/ML
INJECTION, SOLUTION INTRAMUSCULAR; INTRAVENOUS
Status: DISPENSED
Start: 2025-02-13